# Patient Record
Sex: FEMALE | Race: WHITE | Employment: FULL TIME | URBAN - METROPOLITAN AREA
[De-identification: names, ages, dates, MRNs, and addresses within clinical notes are randomized per-mention and may not be internally consistent; named-entity substitution may affect disease eponyms.]

---

## 2018-10-02 LAB
ABO + RH BLD: NORMAL
ABO + RH BLD: NORMAL
BLD GP AB SCN SERPL QL: NORMAL
HBV SURFACE AG SERPL QL IA: NORMAL
HIV 1+2 AB+HIV1 P24 AG SERPL QL IA: NORMAL
RUBELLA ANTIBODY IGG QUANTITATIVE: NORMAL IU/ML

## 2019-04-09 LAB — GROUP B STREP PCR: POSITIVE

## 2019-05-02 ENCOUNTER — HOSPITAL ENCOUNTER (OUTPATIENT)
Facility: CLINIC | Age: 34
Discharge: HOME | End: 2019-05-02
Attending: OBSTETRICS & GYNECOLOGY | Admitting: OBSTETRICS & GYNECOLOGY
Payer: COMMERCIAL

## 2019-05-02 VITALS
SYSTOLIC BLOOD PRESSURE: 129 MMHG | BODY MASS INDEX: 28.41 KG/M2 | RESPIRATION RATE: 18 BRPM | DIASTOLIC BLOOD PRESSURE: 83 MMHG | WEIGHT: 181 LBS | TEMPERATURE: 98.8 F | HEIGHT: 67 IN

## 2019-05-02 PROBLEM — Z36.89 ENCOUNTER FOR TRIAGE IN PREGNANT PATIENT: Status: ACTIVE | Noted: 2019-05-02

## 2019-05-02 LAB — RUPTURE OF FETAL MEMBRANES BY ROM PLUS: NEGATIVE

## 2019-05-02 PROCEDURE — 59025 FETAL NON-STRESS TEST: CPT

## 2019-05-02 PROCEDURE — G0463 HOSPITAL OUTPT CLINIC VISIT: HCPCS | Mod: 25

## 2019-05-02 PROCEDURE — 84112 EVAL AMNIOTIC FLUID PROTEIN: CPT | Performed by: OBSTETRICS & GYNECOLOGY

## 2019-05-02 RX ORDER — PRENATAL VIT/IRON FUM/FOLIC AC 27MG-0.8MG
1 TABLET ORAL DAILY
COMMUNITY

## 2019-05-02 RX ORDER — OXYTOCIN 10 [USP'U]/ML
10 INJECTION, SOLUTION INTRAMUSCULAR; INTRAVENOUS
Status: CANCELLED | OUTPATIENT
Start: 2019-05-02

## 2019-05-02 RX ORDER — SODIUM CHLORIDE, SODIUM LACTATE, POTASSIUM CHLORIDE, CALCIUM CHLORIDE 600; 310; 30; 20 MG/100ML; MG/100ML; MG/100ML; MG/100ML
INJECTION, SOLUTION INTRAVENOUS CONTINUOUS
Status: CANCELLED | OUTPATIENT
Start: 2019-05-02

## 2019-05-02 RX ORDER — LIDOCAINE 40 MG/G
CREAM TOPICAL
Status: CANCELLED | OUTPATIENT
Start: 2019-05-02

## 2019-05-02 RX ORDER — METHYLERGONOVINE MALEATE 0.2 MG/ML
200 INJECTION INTRAVENOUS
Status: CANCELLED | OUTPATIENT
Start: 2019-05-02

## 2019-05-02 RX ORDER — NALOXONE HYDROCHLORIDE 0.4 MG/ML
.1-.4 INJECTION, SOLUTION INTRAMUSCULAR; INTRAVENOUS; SUBCUTANEOUS
Status: CANCELLED | OUTPATIENT
Start: 2019-05-02

## 2019-05-02 RX ORDER — CEFAZOLIN SODIUM 2 G/100ML
2 INJECTION, SOLUTION INTRAVENOUS ONCE
Status: CANCELLED | OUTPATIENT
Start: 2019-05-02 | End: 2019-05-02

## 2019-05-02 RX ORDER — CARBOPROST TROMETHAMINE 250 UG/ML
250 INJECTION, SOLUTION INTRAMUSCULAR
Status: CANCELLED | OUTPATIENT
Start: 2019-05-02

## 2019-05-02 RX ORDER — OXYCODONE AND ACETAMINOPHEN 5; 325 MG/1; MG/1
1 TABLET ORAL
Status: CANCELLED | OUTPATIENT
Start: 2019-05-02

## 2019-05-02 RX ORDER — CEFAZOLIN SODIUM 1 G/3ML
1 INJECTION, POWDER, FOR SOLUTION INTRAMUSCULAR; INTRAVENOUS EVERY 8 HOURS
Status: CANCELLED | OUTPATIENT
Start: 2019-05-02

## 2019-05-02 RX ORDER — ONDANSETRON 2 MG/ML
4 INJECTION INTRAMUSCULAR; INTRAVENOUS EVERY 6 HOURS PRN
Status: CANCELLED | OUTPATIENT
Start: 2019-05-02

## 2019-05-02 RX ORDER — IBUPROFEN 400 MG/1
800 TABLET, FILM COATED ORAL
Status: CANCELLED | OUTPATIENT
Start: 2019-05-02

## 2019-05-02 RX ORDER — OXYTOCIN/0.9 % SODIUM CHLORIDE 30/500 ML
100-340 PLASTIC BAG, INJECTION (ML) INTRAVENOUS CONTINUOUS PRN
Status: CANCELLED | OUTPATIENT
Start: 2019-05-02

## 2019-05-02 RX ORDER — CETIRIZINE HYDROCHLORIDE 10 MG/1
10 TABLET ORAL DAILY
COMMUNITY

## 2019-05-02 RX ORDER — OXYTOCIN/0.9 % SODIUM CHLORIDE 30/500 ML
1-24 PLASTIC BAG, INJECTION (ML) INTRAVENOUS CONTINUOUS
Status: CANCELLED | OUTPATIENT
Start: 2019-05-02

## 2019-05-02 RX ORDER — ACETAMINOPHEN 325 MG/1
650 TABLET ORAL EVERY 4 HOURS PRN
Status: CANCELLED | OUTPATIENT
Start: 2019-05-02

## 2019-05-02 ASSESSMENT — MIFFLIN-ST. JEOR: SCORE: 1558.64

## 2019-05-02 NOTE — DISCHARGE INSTRUCTIONS
Discharge Instruction for Undelivered Patients      You were seen for: Membrane Assessment  We Consulted:   You had (Test or Medicine): NST, ROM+     Diet:   Drink 8 to 12 glasses of liquids (milk, juice, water) every day.  You may eat meals and snacks.     Activity:  Call your doctor or nurse midwife if your baby is moving less than usual.     Call your provider if you notice:  Swelling in your face or increased swelling in your hands or legs.  Headaches that are not relieved by Tylenol (acetaminophen).  Changes in your vision (blurring: seeing spots or stars.)  Nausea (sick to your stomach) and vomiting (throwing up).   Weight gain of 5 pounds or more per week.  Heartburn that doesn't go away.  Signs of bladder infection: pain when you urinate (use the toilet), need to go more often and more urgently.  The bag of ch (rupture of membranes) breaks, or you notice leaking in your underwear.  Bright red blood in your underwear.  Abdominal (lower belly) or stomach pain.  Second (plus) baby: Contractions (tightening) less than 10 minutes apart and getting stronger.  Increase or change in vaginal discharge (note the color and amount)      Follow-up:  As scheduled in the clinic

## 2019-05-02 NOTE — PLAN OF CARE
1705. Multip, 39 weeks arrives from home after noting some clear vaginal fluid leaking that was noticed at 1500 today.  Monitors applied with pts consent.  Health history obtained.  Pt notes feeling some mild cramping, 1/10.     1730.  ROM+ obtained and sent.   No fluid seen on chux pad that pt is sitting on.   Pt declines feeling the need for cervical exam, as she is not having much pain and is planning to return tomorrow morning for an elective induction with .   1810.  called back, after text sent.  Updated regarding pts arrival, fetal and uterine monitoring, negative amnisure.  Discharge orders received.   Pt verbalizes agreement to go home, denies any needs or concerns, questions answered.

## 2019-05-03 ENCOUNTER — ANESTHESIA EVENT (OUTPATIENT)
Dept: OBGYN | Facility: CLINIC | Age: 34
End: 2019-05-03
Payer: COMMERCIAL

## 2019-05-03 ENCOUNTER — ANESTHESIA (OUTPATIENT)
Dept: OBGYN | Facility: CLINIC | Age: 34
End: 2019-05-03
Payer: COMMERCIAL

## 2019-05-03 ENCOUNTER — HOSPITAL ENCOUNTER (INPATIENT)
Facility: CLINIC | Age: 34
LOS: 2 days | Discharge: HOME HEALTH | End: 2019-05-05
Attending: OBSTETRICS & GYNECOLOGY | Admitting: OBSTETRICS & GYNECOLOGY
Payer: COMMERCIAL

## 2019-05-03 LAB
ABO + RH BLD: ABNORMAL
ABO + RH BLD: ABNORMAL
BLD GP AB INVEST PLASRBC-IMP: ABNORMAL
BLD GP AB SCN SERPL QL: ABNORMAL
BLOOD BANK CMNT PATIENT-IMP: ABNORMAL
BLOOD BANK CMNT PATIENT-IMP: NORMAL
ERYTHROCYTE [DISTWIDTH] IN BLOOD BY AUTOMATED COUNT: 12.8 % (ref 10–15)
HCT VFR BLD AUTO: 38.3 % (ref 35–47)
HGB BLD-MCNC: 13.3 G/DL (ref 11.7–15.7)
MCH RBC QN AUTO: 32.2 PG (ref 26.5–33)
MCHC RBC AUTO-ENTMCNC: 34.7 G/DL (ref 31.5–36.5)
MCV RBC AUTO: 93 FL (ref 78–100)
PLATELET # BLD AUTO: 240 10E9/L (ref 150–450)
RBC # BLD AUTO: 4.13 10E12/L (ref 3.8–5.2)
SPECIMEN EXP DATE BLD: ABNORMAL
WBC # BLD AUTO: 16.2 10E9/L (ref 4–11)

## 2019-05-03 PROCEDURE — 00HU33Z INSERTION OF INFUSION DEVICE INTO SPINAL CANAL, PERCUTANEOUS APPROACH: ICD-10-PCS | Performed by: ANESTHESIOLOGY

## 2019-05-03 PROCEDURE — 25000128 H RX IP 250 OP 636: Performed by: PHYSICIAN ASSISTANT

## 2019-05-03 PROCEDURE — 25800030 ZZH RX IP 258 OP 636: Performed by: PHYSICIAN ASSISTANT

## 2019-05-03 PROCEDURE — 25000128 H RX IP 250 OP 636: Performed by: ANESTHESIOLOGY

## 2019-05-03 PROCEDURE — 86870 RBC ANTIBODY IDENTIFICATION: CPT | Performed by: OBSTETRICS & GYNECOLOGY

## 2019-05-03 PROCEDURE — 86780 TREPONEMA PALLIDUM: CPT | Performed by: OBSTETRICS & GYNECOLOGY

## 2019-05-03 PROCEDURE — 86850 RBC ANTIBODY SCREEN: CPT | Performed by: OBSTETRICS & GYNECOLOGY

## 2019-05-03 PROCEDURE — 27110038 ZZH RX 271: Performed by: ANESTHESIOLOGY

## 2019-05-03 PROCEDURE — 0KQM0ZZ REPAIR PERINEUM MUSCLE, OPEN APPROACH: ICD-10-PCS | Performed by: OBSTETRICS & GYNECOLOGY

## 2019-05-03 PROCEDURE — 3E0R3BZ INTRODUCTION OF ANESTHETIC AGENT INTO SPINAL CANAL, PERCUTANEOUS APPROACH: ICD-10-PCS | Performed by: ANESTHESIOLOGY

## 2019-05-03 PROCEDURE — 86900 BLOOD TYPING SEROLOGIC ABO: CPT | Performed by: OBSTETRICS & GYNECOLOGY

## 2019-05-03 PROCEDURE — 25000132 ZZH RX MED GY IP 250 OP 250 PS 637: Performed by: OBSTETRICS & GYNECOLOGY

## 2019-05-03 PROCEDURE — 86901 BLOOD TYPING SEROLOGIC RH(D): CPT | Performed by: OBSTETRICS & GYNECOLOGY

## 2019-05-03 PROCEDURE — 36415 COLL VENOUS BLD VENIPUNCTURE: CPT | Performed by: OBSTETRICS & GYNECOLOGY

## 2019-05-03 PROCEDURE — 72200001 ZZH LABOR CARE VAGINAL DELIVERY SINGLE

## 2019-05-03 PROCEDURE — 12000035 ZZH R&B POSTPARTUM

## 2019-05-03 PROCEDURE — 37000011 ZZH ANESTHESIA WARD SERVICE

## 2019-05-03 PROCEDURE — 85027 COMPLETE CBC AUTOMATED: CPT | Performed by: OBSTETRICS & GYNECOLOGY

## 2019-05-03 PROCEDURE — 25000125 ZZHC RX 250: Performed by: ANESTHESIOLOGY

## 2019-05-03 PROCEDURE — 25000125 ZZHC RX 250: Performed by: PHYSICIAN ASSISTANT

## 2019-05-03 RX ORDER — NALOXONE HYDROCHLORIDE 0.4 MG/ML
.1-.4 INJECTION, SOLUTION INTRAMUSCULAR; INTRAVENOUS; SUBCUTANEOUS
Status: DISCONTINUED | OUTPATIENT
Start: 2019-05-03 | End: 2019-05-05 | Stop reason: HOSPADM

## 2019-05-03 RX ORDER — CEFAZOLIN SODIUM 1 G/3ML
1 INJECTION, POWDER, FOR SOLUTION INTRAMUSCULAR; INTRAVENOUS EVERY 8 HOURS
Status: DISCONTINUED | OUTPATIENT
Start: 2019-05-03 | End: 2019-05-03

## 2019-05-03 RX ORDER — LANOLIN 100 %
OINTMENT (GRAM) TOPICAL
Status: DISCONTINUED | OUTPATIENT
Start: 2019-05-03 | End: 2019-05-05 | Stop reason: HOSPADM

## 2019-05-03 RX ORDER — OXYCODONE AND ACETAMINOPHEN 5; 325 MG/1; MG/1
1 TABLET ORAL
Status: DISCONTINUED | OUTPATIENT
Start: 2019-05-03 | End: 2019-05-05 | Stop reason: HOSPADM

## 2019-05-03 RX ORDER — ACETAMINOPHEN 325 MG/1
650 TABLET ORAL EVERY 4 HOURS PRN
Status: DISCONTINUED | OUTPATIENT
Start: 2019-05-03 | End: 2019-05-05 | Stop reason: HOSPADM

## 2019-05-03 RX ORDER — OXYTOCIN/0.9 % SODIUM CHLORIDE 30/500 ML
100-340 PLASTIC BAG, INJECTION (ML) INTRAVENOUS CONTINUOUS PRN
Status: COMPLETED | OUTPATIENT
Start: 2019-05-03 | End: 2019-05-03

## 2019-05-03 RX ORDER — METHYLERGONOVINE MALEATE 0.2 MG/ML
200 INJECTION INTRAVENOUS
Status: DISCONTINUED | OUTPATIENT
Start: 2019-05-03 | End: 2019-05-05 | Stop reason: HOSPADM

## 2019-05-03 RX ORDER — BISACODYL 10 MG
10 SUPPOSITORY, RECTAL RECTAL DAILY PRN
Status: DISCONTINUED | OUTPATIENT
Start: 2019-05-05 | End: 2019-05-05 | Stop reason: HOSPADM

## 2019-05-03 RX ORDER — IBUPROFEN 400 MG/1
800 TABLET, FILM COATED ORAL
Status: DISCONTINUED | OUTPATIENT
Start: 2019-05-03 | End: 2019-05-05 | Stop reason: HOSPADM

## 2019-05-03 RX ORDER — NALBUPHINE HYDROCHLORIDE 10 MG/ML
2.5-5 INJECTION, SOLUTION INTRAMUSCULAR; INTRAVENOUS; SUBCUTANEOUS EVERY 6 HOURS PRN
Status: DISCONTINUED | OUTPATIENT
Start: 2019-05-03 | End: 2019-05-03

## 2019-05-03 RX ORDER — AMOXICILLIN 250 MG
2 CAPSULE ORAL 2 TIMES DAILY
Status: DISCONTINUED | OUTPATIENT
Start: 2019-05-03 | End: 2019-05-05 | Stop reason: HOSPADM

## 2019-05-03 RX ORDER — SODIUM CHLORIDE, SODIUM LACTATE, POTASSIUM CHLORIDE, CALCIUM CHLORIDE 600; 310; 30; 20 MG/100ML; MG/100ML; MG/100ML; MG/100ML
INJECTION, SOLUTION INTRAVENOUS CONTINUOUS
Status: DISCONTINUED | OUTPATIENT
Start: 2019-05-03 | End: 2019-05-05 | Stop reason: HOSPADM

## 2019-05-03 RX ORDER — AMOXICILLIN 250 MG
1 CAPSULE ORAL 2 TIMES DAILY
Status: DISCONTINUED | OUTPATIENT
Start: 2019-05-03 | End: 2019-05-05 | Stop reason: HOSPADM

## 2019-05-03 RX ORDER — CEFAZOLIN SODIUM 2 G/100ML
2 INJECTION, SOLUTION INTRAVENOUS ONCE
Status: COMPLETED | OUTPATIENT
End: 2019-05-03

## 2019-05-03 RX ORDER — ONDANSETRON 2 MG/ML
4 INJECTION INTRAMUSCULAR; INTRAVENOUS EVERY 6 HOURS PRN
Status: DISCONTINUED | OUTPATIENT
Start: 2019-05-03 | End: 2019-05-05 | Stop reason: HOSPADM

## 2019-05-03 RX ORDER — FENTANYL CITRATE 50 UG/ML
100 INJECTION, SOLUTION INTRAMUSCULAR; INTRAVENOUS ONCE
Status: COMPLETED | OUTPATIENT
Start: 2019-05-03 | End: 2019-05-03

## 2019-05-03 RX ORDER — EPHEDRINE SULFATE 50 MG/ML
5 INJECTION, SOLUTION INTRAMUSCULAR; INTRAVENOUS; SUBCUTANEOUS
Status: DISCONTINUED | OUTPATIENT
Start: 2019-05-03 | End: 2019-05-03

## 2019-05-03 RX ORDER — OXYTOCIN/0.9 % SODIUM CHLORIDE 30/500 ML
1-24 PLASTIC BAG, INJECTION (ML) INTRAVENOUS CONTINUOUS
Status: DISCONTINUED | OUTPATIENT
Start: 2019-05-03 | End: 2019-05-05 | Stop reason: HOSPADM

## 2019-05-03 RX ORDER — ROPIVACAINE HYDROCHLORIDE 2 MG/ML
10 INJECTION, SOLUTION EPIDURAL; INFILTRATION; PERINEURAL ONCE
Status: COMPLETED | OUTPATIENT
Start: 2019-05-03 | End: 2019-05-03

## 2019-05-03 RX ORDER — HYDROCORTISONE 2.5 %
CREAM (GRAM) TOPICAL 3 TIMES DAILY PRN
Status: DISCONTINUED | OUTPATIENT
Start: 2019-05-03 | End: 2019-05-05 | Stop reason: HOSPADM

## 2019-05-03 RX ORDER — CETIRIZINE HYDROCHLORIDE 10 MG/1
10 TABLET ORAL DAILY
Status: DISCONTINUED | OUTPATIENT
Start: 2019-05-03 | End: 2019-05-05 | Stop reason: HOSPADM

## 2019-05-03 RX ORDER — FLUTICASONE PROPIONATE 50 MCG
1 SPRAY, SUSPENSION (ML) NASAL DAILY
COMMUNITY

## 2019-05-03 RX ORDER — OXYTOCIN/0.9 % SODIUM CHLORIDE 30/500 ML
100 PLASTIC BAG, INJECTION (ML) INTRAVENOUS CONTINUOUS
Status: DISCONTINUED | OUTPATIENT
Start: 2019-05-03 | End: 2019-05-05 | Stop reason: HOSPADM

## 2019-05-03 RX ORDER — LIDOCAINE HYDROCHLORIDE AND EPINEPHRINE 15; 5 MG/ML; UG/ML
INJECTION, SOLUTION EPIDURAL PRN
Status: DISCONTINUED | OUTPATIENT
Start: 2019-05-03 | End: 2019-05-04 | Stop reason: HOSPADM

## 2019-05-03 RX ORDER — IBUPROFEN 400 MG/1
800 TABLET, FILM COATED ORAL EVERY 6 HOURS PRN
Status: DISCONTINUED | OUTPATIENT
Start: 2019-05-03 | End: 2019-05-05 | Stop reason: HOSPADM

## 2019-05-03 RX ORDER — CARBOPROST TROMETHAMINE 250 UG/ML
250 INJECTION, SOLUTION INTRAMUSCULAR
Status: DISCONTINUED | OUTPATIENT
Start: 2019-05-03 | End: 2019-05-05 | Stop reason: HOSPADM

## 2019-05-03 RX ORDER — MISOPROSTOL 200 UG/1
800 TABLET ORAL
Status: DISCONTINUED | OUTPATIENT
Start: 2019-05-03 | End: 2019-05-05 | Stop reason: HOSPADM

## 2019-05-03 RX ORDER — OXYTOCIN/0.9 % SODIUM CHLORIDE 30/500 ML
340 PLASTIC BAG, INJECTION (ML) INTRAVENOUS CONTINUOUS PRN
Status: DISCONTINUED | OUTPATIENT
Start: 2019-05-03 | End: 2019-05-05 | Stop reason: HOSPADM

## 2019-05-03 RX ORDER — OXYTOCIN 10 [USP'U]/ML
10 INJECTION, SOLUTION INTRAMUSCULAR; INTRAVENOUS
Status: DISCONTINUED | OUTPATIENT
Start: 2019-05-03 | End: 2019-05-05 | Stop reason: HOSPADM

## 2019-05-03 RX ORDER — LIDOCAINE 40 MG/G
CREAM TOPICAL
Status: DISCONTINUED | OUTPATIENT
Start: 2019-05-03 | End: 2019-05-05 | Stop reason: HOSPADM

## 2019-05-03 RX ORDER — NALOXONE HYDROCHLORIDE 0.4 MG/ML
.1-.4 INJECTION, SOLUTION INTRAMUSCULAR; INTRAVENOUS; SUBCUTANEOUS
Status: DISCONTINUED | OUTPATIENT
Start: 2019-05-03 | End: 2019-05-03

## 2019-05-03 RX ADMIN — OXYTOCIN-SODIUM CHLORIDE 0.9% IV SOLN 30 UNIT/500ML 340 ML/HR: 30-0.9/5 SOLUTION at 14:27

## 2019-05-03 RX ADMIN — CETIRIZINE HYDROCHLORIDE 10 MG: 10 TABLET, FILM COATED ORAL at 20:29

## 2019-05-03 RX ADMIN — CEFAZOLIN SODIUM 2 G: 2 INJECTION, SOLUTION INTRAVENOUS at 08:19

## 2019-05-03 RX ADMIN — Medication 12 ML/HR: at 13:30

## 2019-05-03 RX ADMIN — ROPIVACAINE HYDROCHLORIDE 10 ML: 2 INJECTION, SOLUTION EPIDURAL; INFILTRATION at 13:04

## 2019-05-03 RX ADMIN — ACETAMINOPHEN 650 MG: 325 TABLET, FILM COATED ORAL at 20:29

## 2019-05-03 RX ADMIN — SODIUM CHLORIDE, POTASSIUM CHLORIDE, SODIUM LACTATE AND CALCIUM CHLORIDE: 600; 310; 30; 20 INJECTION, SOLUTION INTRAVENOUS at 08:15

## 2019-05-03 RX ADMIN — FENTANYL CITRATE 100 MCG: 50 INJECTION, SOLUTION INTRAMUSCULAR; INTRAVENOUS at 13:04

## 2019-05-03 RX ADMIN — LIDOCAINE HYDROCHLORIDE AND EPINEPHRINE 3 ML: 15; 5 INJECTION, SOLUTION EPIDURAL at 13:01

## 2019-05-03 RX ADMIN — SENNOSIDES AND DOCUSATE SODIUM 1 TABLET: 8.6; 5 TABLET ORAL at 20:30

## 2019-05-03 RX ADMIN — IBUPROFEN 800 MG: 400 TABLET ORAL at 15:31

## 2019-05-03 RX ADMIN — IBUPROFEN 800 MG: 400 TABLET ORAL at 20:30

## 2019-05-03 RX ADMIN — ACETAMINOPHEN 650 MG: 325 TABLET, FILM COATED ORAL at 15:31

## 2019-05-03 ASSESSMENT — MIFFLIN-ST. JEOR: SCORE: 1558.64

## 2019-05-03 NOTE — PLAN OF CARE
Nga presents with  Meghan for elective induction of labor. She was here last evening to be evaluated for leaking fluid, but was glad to go home for some rest. Nga agrees to placement of fetal and uterine monitors and placement of IV for GBS antibiotic prophylaxis. 2g ancef given d/t amoxicillin allergy. FHT moderate variability, 140s with accelerations present. Occasional contractions. Dr. Singh in department to start induction and for AROM. SVE 4cm/  70  / -2   Posterior. Plan per Dr. Singh is to evaluate SVE again in 2-3 hours or sooner if labor progresses. If no cervical change, begin oxytocin induction.

## 2019-05-03 NOTE — L&D DELIVERY NOTE
[facility]        The patient is a 34 year old  at 39+1 wks gestation admitted to labor and delivery in [facility].      For pain management she had an epidural.  She had excellent maternal expulsive efforts. She delivered a viable male infant apgars of 8 at 1 min and 9 at 5 minutes.  The infant was bulb suctioned upon delivery.   The nursery team was in attendance.    The placenta delivered spontaneously and intact.  She received pitocin upon placental delivery.  The estimated blood loss was 200cc.    The cervix and vagina were inspected.  Second degree laceration repaired with 3-0 Vicryl.      Mother and baby did well and went to normal postpartum and  nursery.        Lorin Montano MD

## 2019-05-03 NOTE — PLAN OF CARE
Dr. Montano in room for SVE - states unchanged. Would like to begin pitocin.    Nga breathing well through ctx but visibly grimacing and uncomfortable. Requesting epidural. Ctx q2-3 minutes and palpate moderate.

## 2019-05-03 NOTE — PLAN OF CARE
Ctx mild and increasing frequency q4-7mins; Leaking scant fluid and scant blood tinge show when wiping in bathroom. Currently up ambulating in dhillon.    Nga looking teary-eyed and quiet. Minimal eye contact. States that she's  anxious because this is different than first birth.

## 2019-05-03 NOTE — PROVIDER NOTIFICATION
Dr. Montano notified of prolonged deceleration post-epidural. BP decreased  To 96/52. Resolved with position changes left tilt to right lateral and 250 ml IV fluid bolus.    SVE 8/95/-1.Requested to recheck SVE in 30 minutes. Dr. Montano in house and awaiting notification for delivery.

## 2019-05-03 NOTE — PLAN OF CARE
SVE 10/100/+1. Nga feeling intermittent pressure. Dr. Mcgregor notified to come for delivery. Room setup.

## 2019-05-03 NOTE — ANESTHESIA PROCEDURE NOTES
Peripheral nerve/Neuraxial procedure note : epidural catheter  Pre-Procedure  Performed by Chaz Silva MD  Location: OB      Pre-Anesthestic Checklist: patient identified, IV checked, site marked, risks and benefits discussed, informed consent, monitors and equipment checked, pre-op evaluation and at physician/surgeon's request    Timeout  Correct Patient: Yes   Correct Procedure: Yes   Correct Site: Yes   Correct Laterality: N/A   Correct Position: Yes   Site Marked: N/A   .   Procedure Documentation    .    Procedure:    Epidural catheter.  Insertion Site:L3-4  (midline approach) Injection technique: LORT saline   Local skin infiltrated with 3 mL of 1% lidocaine.  LIN at 6 cm     Patient Prep;mask, sterile gloves, povidone-iodine 7.5% surgical scrub, patient draped.  .  Needle: Touhy needle Needle Gauge: 17.    Needle Length (Inches) 3.5  # of attempts: 1 and # of redirects:  .   Catheter: 19 G . .  Catheter threaded easily  .  9 cm at skin.   .    Assessment/Narrative  Paresthesias: No.  .  .  Aspiration negative for heme or CSF  . Test dose of 3 mL lidocaine 1.5% w/ 1:200,000 epinephrine at. Test dose negative for signs of intravascular, subdural or intrathecal injection. Comments:  No complications   Secured with Tegaderm, adhesive spray and tape

## 2019-05-03 NOTE — ANESTHESIA PREPROCEDURE EVALUATION
"Anesthesia Pre-Procedure Evaluation    Patient: Nga Mckeon   MRN: [id number] : 1985          Preoperative Diagnosis: * No surgery found *        Past Medical History:   Diagnosis Date     Uncomplicated asthma     uses albuterol inhaler     Past Surgical History:   Procedure Laterality Date     HERNIA REPAIR  2007     HERNIA REPAIR  2007     HIP SURGERY  2017    left      [facility]         Anesthesia Evaluation       history and physical reviewed .             ROS/MED HX    ENT/Pulmonary:       Neurologic:       Cardiovascular:         METS/Exercise Tolerance:     Hematologic:         Musculoskeletal:         GI/Hepatic:         Renal/Genitourinary:         Endo:         Psychiatric:         Infectious Disease:         Malignancy:         Other:                                 Lab Results   Component Value Date    WBC 16.2 (H) 2019    HGB 13.3 2019    HCT 38.3 2019     2019       Preop Vitals  BP Readings from Last 3 Encounters:   19 121/82   19 129/83    Pulse Readings from Last 3 Encounters:   No data found for Pulse      Resp Readings from Last 3 Encounters:   19 16   19 18    SpO2 Readings from Last 3 Encounters:   No data found for SpO2      Temp Readings from Last 1 Encounters:   19 37.2  C (98.9  F) (Temporal)    Ht Readings from Last 1 Encounters:   19 1.702 m (5' 7\")      Wt Readings from Last 1 Encounters:   19 82.1 kg (181 lb)    Estimated body mass index is 28.35 kg/m  as calculated from the following:    Height as of this encounter: 1.702 m (5' 7\").    Weight as of this encounter: 82.1 kg (181 lb).       Anesthesia Plan      History & Physical Review      ASA Status:  2 .        Plan for Epidural          Postoperative Care      Consents  Anesthetic plan, risks, benefits and alternatives discussed with:  Patient and Spouse..                 Chaz Silva MD  "

## 2019-05-03 NOTE — PLAN OF CARE
Dr. Montano at bedside at 1418, Nga positioned in stirrups, bladder emptied for approximately 300cc before pushing started. Pushing efforts through two contractions and delivery of viable male apgars 8,9 at 1424. Placed skin to skin with one minute delayed cord clamping.

## 2019-05-03 NOTE — PLAN OF CARE
1040: Dr Montano in department. Would like to start oxytocin augmentation per orders.    1050: SVE 5.5/80/-2, ctx q3-4 mins and increasing in intensity. Nga states that they start her in back and wrap around front. D/t cervical change pt. Would prefer to hold off on oxytocin at this time    1105: Dr. Montano notified via electronic page that pt. Would prefer to hold off on oxytocin and SVE.

## 2019-05-04 LAB
HGB BLD-MCNC: 12.9 G/DL (ref 11.7–15.7)
T PALLIDUM AB SER QL: NONREACTIVE

## 2019-05-04 PROCEDURE — 12000035 ZZH R&B POSTPARTUM

## 2019-05-04 PROCEDURE — 86900 BLOOD TYPING SEROLOGIC ABO: CPT | Performed by: OBSTETRICS & GYNECOLOGY

## 2019-05-04 PROCEDURE — 86901 BLOOD TYPING SEROLOGIC RH(D): CPT | Performed by: OBSTETRICS & GYNECOLOGY

## 2019-05-04 PROCEDURE — 25000132 ZZH RX MED GY IP 250 OP 250 PS 637: Performed by: OBSTETRICS & GYNECOLOGY

## 2019-05-04 PROCEDURE — 85461 HEMOGLOBIN FETAL: CPT | Performed by: OBSTETRICS & GYNECOLOGY

## 2019-05-04 PROCEDURE — 25000128 H RX IP 250 OP 636: Performed by: OBSTETRICS & GYNECOLOGY

## 2019-05-04 PROCEDURE — 85018 HEMOGLOBIN: CPT | Performed by: OBSTETRICS & GYNECOLOGY

## 2019-05-04 PROCEDURE — 36415 COLL VENOUS BLD VENIPUNCTURE: CPT | Performed by: OBSTETRICS & GYNECOLOGY

## 2019-05-04 RX ADMIN — CETIRIZINE HYDROCHLORIDE 10 MG: 10 TABLET, FILM COATED ORAL at 22:02

## 2019-05-04 RX ADMIN — ACETAMINOPHEN 650 MG: 325 TABLET, FILM COATED ORAL at 08:53

## 2019-05-04 RX ADMIN — SENNOSIDES AND DOCUSATE SODIUM 1 TABLET: 8.6; 5 TABLET ORAL at 08:53

## 2019-05-04 RX ADMIN — ACETAMINOPHEN 650 MG: 325 TABLET, FILM COATED ORAL at 22:00

## 2019-05-04 RX ADMIN — IBUPROFEN 800 MG: 400 TABLET ORAL at 02:43

## 2019-05-04 RX ADMIN — IBUPROFEN 800 MG: 400 TABLET ORAL at 16:35

## 2019-05-04 RX ADMIN — HUMAN RHO(D) IMMUNE GLOBULIN 300 MCG: 300 INJECTION, SOLUTION INTRAMUSCULAR at 23:51

## 2019-05-04 RX ADMIN — IBUPROFEN 800 MG: 400 TABLET ORAL at 08:53

## 2019-05-04 RX ADMIN — IBUPROFEN 800 MG: 400 TABLET ORAL at 22:00

## 2019-05-04 RX ADMIN — ACETAMINOPHEN 650 MG: 325 TABLET, FILM COATED ORAL at 02:43

## 2019-05-04 RX ADMIN — ACETAMINOPHEN 650 MG: 325 TABLET, FILM COATED ORAL at 16:35

## 2019-05-04 NOTE — PLAN OF CARE
Patient admitted to room 421 around 1645.  Both patient and  oriented to room/floor.  Vital signs stable.  Fundus firm at U. Scant rubra flow.  Voiding without difficulty.  S.L. Discontinued at 2100 and patient wanting to shower.  Pain controlled with tylenol and ibuprofen.  Patient requesting Zertec for allergies.  Orders received per MD dodson and given at 2030.  Continue to monitor.

## 2019-05-04 NOTE — PLAN OF CARE
Vital signs stable, afebrile, voiding well, ice and tucks to perineum prn, FFU/1-2 scant rubra lochia, able to rest, pain well controlled with medications, rates her pain 3/10, breast feeding  skin-to-skin on demand.  is here and is supportive.

## 2019-05-04 NOTE — PROGRESS NOTES
S: Patient doing well with no overnight issues and no current complaints.  Pain is well controlled.  Tolerating PO intake.  Breast feeding without issues.  Ambulating without difficulty.  Voiding and passing flatus.  Lochia is less than normal menses and decreasing.  Denies fevers, headaches, changes in vision, chest pain, SOB, nausea, vomiting, diarrhea, constipation, RUQ tenderness, dysuria, or LE pain.    O:   Vitals:    19 1746 19 2000 19 0200 19 0804   BP: 115/68 126/80 120/81 127/86   Resp:  18   Temp: 98.4  F (36.9  C) 98  F (36.7  C) 98.1  F (36.7  C) 98.1  F (36.7  C)   TempSrc: Oral Oral Oral    SpO2:       Weight:       Height:         NAD, AAOx3, RRR, CTAB, ABd soft NTND, Firm fundus 1cm below the umbilicus, LE NT no edema    A: 35yo  s/PPD#1  who is recovering well.    P: Routine post partum care.  Plan for discharge tomorrow.  Desire circ for baby boy.

## 2019-05-04 NOTE — PLAN OF CARE
Doing well,vss,voiding with out difficulty,pain control with tylenol&ibuprofen,baby breast feeding well.

## 2019-05-04 NOTE — LACTATION NOTE
Initial visit with Nga, FOB , family and baby boy.  Baby content and alert looking at Nga.   Breastfeeding general information reviewed. Advised to breastfeed exclusively, on demand, avoid pacifiers, bottles and formula unless medically indicated.  Encouraged rooming in, skin to skin, feeding on demand 8-12x/day or sooner if baby cues.  Explained benefits of holding and skin to skin.  Encouraged lots of skin to skin. Instructed on hand expression.   Continues to nurse well per mom, occasionally pinching.  Instructed on how to obtain a deep latch positioning aides.  Will follow up with Nader and has a breast pump for home.  No further questions at this time. Will follow as needed.    Graciela Capps BSN, RN, PHN, RNC-MNN, IBCLC

## 2019-05-05 VITALS
BODY MASS INDEX: 28.41 KG/M2 | HEART RATE: 89 BPM | OXYGEN SATURATION: 100 % | SYSTOLIC BLOOD PRESSURE: 125 MMHG | DIASTOLIC BLOOD PRESSURE: 78 MMHG | RESPIRATION RATE: 16 BRPM | HEIGHT: 67 IN | TEMPERATURE: 98 F | WEIGHT: 181 LBS

## 2019-05-05 LAB
ABO + RH BLD: NORMAL
ABO + RH BLD: NORMAL
BLOOD BANK CMNT PATIENT-IMP: NORMAL
DATE RH IMM GL GVN: NORMAL
FETAL CELL SCN BLD QL ROSETTE: NORMAL
RH IG VIALS RECOM PATIENT: NORMAL

## 2019-05-05 PROCEDURE — 25000132 ZZH RX MED GY IP 250 OP 250 PS 637: Performed by: OBSTETRICS & GYNECOLOGY

## 2019-05-05 RX ORDER — IBUPROFEN 800 MG/1
800 TABLET, FILM COATED ORAL EVERY 6 HOURS PRN
COMMUNITY
Start: 2019-05-05

## 2019-05-05 RX ORDER — AMOXICILLIN 250 MG
1 CAPSULE ORAL 2 TIMES DAILY
COMMUNITY
Start: 2019-05-05 | End: 2019-09-19

## 2019-05-05 RX ORDER — ACETAMINOPHEN 325 MG/1
650 TABLET ORAL EVERY 4 HOURS PRN
COMMUNITY
Start: 2019-05-05 | End: 2019-09-19

## 2019-05-05 RX ADMIN — IBUPROFEN 800 MG: 400 TABLET ORAL at 11:49

## 2019-05-05 RX ADMIN — ACETAMINOPHEN 650 MG: 325 TABLET, FILM COATED ORAL at 11:49

## 2019-05-05 RX ADMIN — IBUPROFEN 800 MG: 400 TABLET ORAL at 05:16

## 2019-05-05 RX ADMIN — ACETAMINOPHEN 650 MG: 325 TABLET, FILM COATED ORAL at 05:16

## 2019-05-05 NOTE — PLAN OF CARE
Fundus firm and bleeding wnl.  VSS.  Voiding without difficulty.  Taking tylenol and ibuprofen every 6 hrs with good relief.  Up independently.  Using ice and tucks.  Encouraged to call with questions or concerns.  Will continue to monitor.

## 2019-05-05 NOTE — ANESTHESIA POSTPROCEDURE EVALUATION
Patient: Nga Mckeon    * No procedures listed *    Diagnosis:* No pre-op diagnosis entered *  Diagnosis Additional Information: No value filed.    Anesthesia Type:  No value filed.    Note:  Anesthesia Post Evaluation    Patient location during evaluation: Floor and Bedside  Patient participation: Able to fully participate in evaluation  Level of consciousness: awake and alert  Cardiovascular status: acceptable  Respiratory status: acceptable       Comments: No apparent anesthetic complications. Patient sensory and motor intact.  Ambulating and voiding well. Denies HA. Patient satisfied with epidural analgesia.     Janak Fermin D.O.  Staff Anesthesiologist  [facility]          Last vitals:  Vitals:    05/04/19 0200 05/04/19 0804 05/04/19 1635   BP: 120/81 127/86 124/78   Resp: 18 18 16   Temp: 36.7  C (98.1  F) 36.7  C (98.1  F) 36.3  C (97.3  F)   SpO2:            Electronically Signed By: Janak Fermin DO  May 4, 2019  7:54 PM

## 2019-05-05 NOTE — PROGRESS NOTES
[facility]       DAILY NOTE - POSTPARTUM DAY 2     SUBJECTIVE:     Pain controlled? Yes  Tolerating a regular diet? YES  Ambulating? YES  Voiding without difficulty? Yes    OBJECTIVE:  Vitals:    19 2300 19 0516 19 0616 19 0747   BP:    125/78   BP Location:    Right arm   Pulse:       Resp: 16 16 16 16   Temp:    98  F (36.7  C)   TempSrc:    Oral   SpO2:       Weight:       Height:           Constitutional: healthy, alert and no distress    Abdomen:  Uterine fundus is firm, non-tender and at the level of the umbilicus     extr neg      LABS:  Hemoglobin   Date Value Ref Range Status   2019 12.9 11.7 - 15.7 g/dL Final   2019 13.3 11.7 - 15.7 g/dL Final       ASSESSMENT:  Post-partum day #2 s/p   Pregnancy complicated by NO COMPLICATIONS    Doing well.       PLAN:   Discharge today.  Return to clinic in 6 weeks.   Continue routine postpartum cares    Shannan Rodriguez

## 2019-05-05 NOTE — PLAN OF CARE
Doing well,vss,voiding with out difficulty,pain control with tylenol&ibuprofen,baby breast feeding well,discharge today.

## 2019-05-05 NOTE — LACTATION NOTE
Routine visit with Nga, FOB, and baby.  Cluster fed last night.  Getting ready for discharge.  Plan: Watch for feeding cues and feed every 2-3 hours and/or on demand. Continue to use feeding log to track intake and appropriate voids and stools. Take feeding log to first follow up appointment or weight check. Encourage skin to skin to promote frequent feedings, thermoregulation and bonding. Follow-up with healthcare provider or lactation consultant for questions or concerns.    Continues to nurse well per mom. No further questions at this time.   Will follow as needed.   Graciela Capps BSN, RN, PHN, RNC-MNN, IBCLC

## 2019-06-20 ENCOUNTER — TRANSFERRED RECORDS (OUTPATIENT)
Dept: HEALTH INFORMATION MANAGEMENT | Facility: CLINIC | Age: 34
End: 2019-06-20

## 2019-09-17 NOTE — PROGRESS NOTES
INDICATIONS:                                                      Is a pregnancy test required: Yes.  Was it positive or negative?  Negative  Was a consent obtained?  Yes    Nga Mckeon is a 34 year old female,   who presents for insertion of an IUD. The risks, benefits and alternatives of IUD insertion were discussed in detail previously. She also has reviewed the product brochure.  She has elected to go ahead with the insertion  today and her questions were answered. Consent was signed. Her LMP began on 2018 and was normal in duration and amount of flow. A pregnancy test was performed today:  Yes    Today's PHQ-2 Score: No flowsheet data found.    PROCEDURE:                                                      The pelvic exam revealed normal external genitalia. On bimanual exam the uterus was Anteverted and Retroverted and normal in size with no tenderness present. A speculum was inserted into the vagina and the cervix was visualized. The cervix was prepped with Betadine . The anterior lip of the cervix was grasped with a single toothed tenaculum. The uterus sounded to 8 cm. A Mirena IUD was then inserted without difficulty. The string was cut to 2 cm.  The patient experienced a mild amount of cramping.    POST PROCEDURE:                                                      She  tolerated the procedure well. There were no complications. Patient was discharged in stable condition.    Return to clinic in 5 weeks for IUD check.  Call if severe cramping, fever, abnormal bleeding, abnormal discharge or pelvic pain develop.  Patient was counseled about the chance of irregular bleeding.    Charmaine De La Vega MD

## 2019-09-19 ENCOUNTER — OFFICE VISIT (OUTPATIENT)
Dept: OBGYN | Facility: CLINIC | Age: 34
End: 2019-09-19
Payer: COMMERCIAL

## 2019-09-19 VITALS
DIASTOLIC BLOOD PRESSURE: 72 MMHG | HEIGHT: 67 IN | BODY MASS INDEX: 25.71 KG/M2 | SYSTOLIC BLOOD PRESSURE: 112 MMHG | WEIGHT: 163.8 LBS

## 2019-09-19 DIAGNOSIS — Z30.430 ENCOUNTER FOR IUD INSERTION: ICD-10-CM

## 2019-09-19 DIAGNOSIS — Z01.812 PRE-PROCEDURE LAB EXAM: ICD-10-CM

## 2019-09-19 DIAGNOSIS — M25.551 PAIN OF RIGHT HIP JOINT: Primary | ICD-10-CM

## 2019-09-19 DIAGNOSIS — Z30.430 ENCOUNTER FOR INSERTION OF INTRAUTERINE CONTRACEPTIVE DEVICE: ICD-10-CM

## 2019-09-19 LAB — HCG UR QL: NEGATIVE

## 2019-09-19 PROCEDURE — 81025 URINE PREGNANCY TEST: CPT | Performed by: OBSTETRICS & GYNECOLOGY

## 2019-09-19 PROCEDURE — 58300 INSERT INTRAUTERINE DEVICE: CPT | Performed by: OBSTETRICS & GYNECOLOGY

## 2019-09-19 ASSESSMENT — ANXIETY QUESTIONNAIRES
GAD7 TOTAL SCORE: 0
6. BECOMING EASILY ANNOYED OR IRRITABLE: NOT AT ALL
2. NOT BEING ABLE TO STOP OR CONTROL WORRYING: NOT AT ALL
IF YOU CHECKED OFF ANY PROBLEMS ON THIS QUESTIONNAIRE, HOW DIFFICULT HAVE THESE PROBLEMS MADE IT FOR YOU TO DO YOUR WORK, TAKE CARE OF THINGS AT HOME, OR GET ALONG WITH OTHER PEOPLE: NOT DIFFICULT AT ALL
7. FEELING AFRAID AS IF SOMETHING AWFUL MIGHT HAPPEN: NOT AT ALL
1. FEELING NERVOUS, ANXIOUS, OR ON EDGE: NOT AT ALL
5. BEING SO RESTLESS THAT IT IS HARD TO SIT STILL: NOT AT ALL
3. WORRYING TOO MUCH ABOUT DIFFERENT THINGS: NOT AT ALL

## 2019-09-19 ASSESSMENT — PATIENT HEALTH QUESTIONNAIRE - PHQ9
SUM OF ALL RESPONSES TO PHQ QUESTIONS 1-9: 1
5. POOR APPETITE OR OVEREATING: NOT AT ALL

## 2019-09-19 ASSESSMENT — MIFFLIN-ST. JEOR: SCORE: 1484.58

## 2019-09-20 ENCOUNTER — TELEPHONE (OUTPATIENT)
Dept: OBGYN | Facility: CLINIC | Age: 34
End: 2019-09-20

## 2019-09-20 ASSESSMENT — ANXIETY QUESTIONNAIRES: GAD7 TOTAL SCORE: 0

## 2019-09-20 NOTE — TELEPHONE ENCOUNTER
Pt saw  yesterday she was going to give her a refferal for physical therapy and pt forgot to mention which clinic she usually goes to for that.   Herman ken [place]

## 2019-10-15 NOTE — PROGRESS NOTES
SUBJECTIVE:                                                   Nga Mckeon is a 34 year old female who presents to clinic today for the following health issue(s):  Patient presents with:  IUD: IUD check No concerns          HPI:  Patient presents for IUD follow up.  Patient reports that she has no cramping pain.  Patient reports that she has had no abnormal bleeding.  Patient reports that she has no concerns with IUD.    No LMP recorded. (Menstrual status: IUD)..     Patient is sexually active, .  Using IUD for contraception.    reports that she has never smoked. She has never used smokeless tobacco.    STD testing offered?  Declined    Health maintenance updated:  yes    Today's PHQ-2 Score:   PHQ-2 (  [employer]) 2019   Q1: Little interest or pleasure in doing things 0   Q2: Feeling down, depressed or hopeless 0   PHQ-2 Score 0     Today's PHQ-9 Score:   PHQ-9 SCORE 2019   PHQ-9 Total Score 1     Today's ANDREW-7 Score:   ANDREW-7 SCORE 2019   Total Score 0       Problem list and histories reviewed & adjusted, as indicated.  Additional history: as documented.    Patient Active Problem List   Diagnosis     Encounter for triage in pregnant patient     Indication for care in labor or delivery     Vaginal delivery     Past Surgical History:   Procedure Laterality Date     HERNIA REPAIR       HERNIA REPAIR  2007     HIP SURGERY  2017    left      [facility]Y       [facility]        Social History     Tobacco Use     Smoking status: Never Smoker     Smokeless tobacco: Never Used   Substance Use Topics     Alcohol use: Yes      Problem (# of Occurrences) Relation (Name,Age of Onset)    Breast Cancer (1) Paternal Grandmother (40)    Heart Disease (1) Maternal Grandmother (60)    Hypertension (1) Mother (40)    Kidney Cancer (1) Maternal Grandfather (65)    Pancreatic Cancer (1) Mother (63)    Thyroid Cancer (1) Maternal Aunt (35)    Thyroid Disease (1) Mother (35)    Uterine  "Cancer (1) Maternal Grandmother (60)            Current Outpatient Medications   Medication Sig     ALBUTEROL IN      cetirizine (ZYRTEC) 10 MG tablet Take 10 mg by mouth daily     fluticasone (FLONASE) 50 MCG/ACT nasal spray Spray 1 spray into both nostrils daily     ibuprofen (ADVIL/MOTRIN) 800 MG tablet Take 1 tablet (800 mg) by mouth every 6 hours as needed for other (cramping)     Prenatal Vit-Fe Fumarate-FA (PRENATAL MULTIVITAMIN W/IRON) 27-0.8 MG tablet Take 1 tablet by mouth daily     No current facility-administered medications for this visit.      Allergies   Allergen Reactions     Amoxil [Amoxicillin] Rash       ROS:  12 point review of systems negative other than symptoms noted below.    OBJECTIVE:     /72   Pulse 78   Ht 1.715 m (5' 7.5\")   Wt 74.3 kg (163 lb 12.8 oz)   BMI 25.28 kg/m    Body mass index is 25.28 kg/m .    Exam:  Constitutional:  Appearance: Well nourished, well developed alert, in no acute distress  Chest:  Respiratory Effort:  Breathing unlabored.   Cardiovascular: no edema  Neurologic:  Mental Status:  Oriented X3.    Psychiatric:  Mentation appears normal and affect normal/bright.  Pelvic Exam:  External Genitalia:     Normal appearance for age, no discharge present, no tenderness present, no inflammatory lesions present, color normal  Vagina:     Normal vaginal vault without central or paravaginal defects, no discharge present, no inflammatory lesions present, no masses present  Bladder:     Nontender to palpation  Urethra:   Urethral Body:  Urethra palpation normal, urethra structural support normal   Urethral Meatus:  No erythema or lesions present  Cervix:     Appearance healthy, no lesions present, nontender to palpation, no bleeding present.  Strings at os      Perineum:     Perineum within normal limits, no evidence of trauma, no rashes or skin lesions present  Anus:     Anus within normal limits, no hemorrhoids present  Inguinal Lymph Nodes:     No lymphadenopathy " present  Pubic Hair:     Normal pubic hair distribution for age  Genitalia and Groin:     No rashes present, no lesions present, no areas of discoloration, no masses present       In-Clinic Test Results:  No results found for this or any previous visit (from the past 24 hour(s)).    ASSESSMENT/PLAN:                                                        ICD-10-CM    1. Intrauterine device surveillance Z30.431    2. Family history of breast cancer Z80.3        There are no Patient Instructions on file for this visit.    1. IUD in place  2.  Patient has significant family history of cancer.  Did discuss doing genetic testing.  Patient is going to get her life insurance panel and she will call for referral to genetic counselor.  3.  Once patient is done breast-feeding she will also proceed with mammogram.  She will call when she is done breast-feeding and we can send referral over.    Lorin Montano MD  [facility] [place]

## 2019-10-22 ENCOUNTER — OFFICE VISIT (OUTPATIENT)
Dept: OBGYN | Facility: CLINIC | Age: 34
End: 2019-10-22
Payer: COMMERCIAL

## 2019-10-22 VITALS
HEIGHT: 68 IN | WEIGHT: 163.8 LBS | DIASTOLIC BLOOD PRESSURE: 72 MMHG | HEART RATE: 78 BPM | SYSTOLIC BLOOD PRESSURE: 110 MMHG | BODY MASS INDEX: 24.83 KG/M2

## 2019-10-22 DIAGNOSIS — Z80.3 FAMILY HISTORY OF BREAST CANCER: ICD-10-CM

## 2019-10-22 DIAGNOSIS — Z30.431 INTRAUTERINE DEVICE SURVEILLANCE: Primary | ICD-10-CM

## 2019-10-22 PROCEDURE — 99213 OFFICE O/P EST LOW 20 MIN: CPT | Performed by: OBSTETRICS & GYNECOLOGY

## 2019-10-22 ASSESSMENT — MIFFLIN-ST. JEOR: SCORE: 1488.55

## 2021-05-12 ENCOUNTER — TRANSCRIBE ORDERS (OUTPATIENT)
Dept: OTHER | Age: 36
End: 2021-05-12

## 2021-05-12 DIAGNOSIS — Z80.9 FAMILY HISTORY OF CANCER: Primary | ICD-10-CM

## 2021-07-07 ENCOUNTER — TRANSCRIBE ORDERS (OUTPATIENT)
Dept: OTHER | Age: 36
End: 2021-07-07

## 2021-07-07 DIAGNOSIS — Z80.9 FAMILY HISTORY OF CANCER: Primary | ICD-10-CM

## 2021-08-05 NOTE — PROGRESS NOTES
Nga is a 36 year old who is being evaluated via a billable video visit.      Video-Visit Details    Type of service: Video Visit    Video Start Time: 12:30  Video End Time: 1:23    Originating Location (pt. Location): Home    Distant Location (provider location): Cancer Risk Management Program    Platform used for Video Visit: [employer]    8/6/2021    Referring Provider: Kinjal Cohen MD    Presenting Information:   I met with Nga Mckeon today for her video genetic counseling visit through the Cancer Risk Management Program to discuss her family history of pancreatic, breast, thyroid, kidney and uterine cancer. She is here today to review this history, cancer screening recommendations, and available genetic testing options.    Personal History:  Nga is a 36 year old female. She  does not have any personal history of cancer. In her hormonal-based medical history, she had her first menstrual period at age 13, her first child at age 31, and is premenopausal. Nga has her ovaries, fallopian tubes and uterus in place, and reports no ovarian cancer screening to date. She reports that she has no history of hormone replacement therapy.  She reports oral contraceptive use for approximately 3-5 years.       Nga had a mammogram when she was 31 years old due to family history, which was normal. Nga has not had nor colonoscopy. Nga denies any history of dermatological exams. She does not regularly do any other cancer screening at this time. Nga reported no history of smoking and occasional alcohol use.    Family History: (Please see scanned pedigree for detailed family history information)    Nga's mother was diagnosed with pancreatic cancer at age 63. Nga reports that her mother had negative genetic testing, however, her report was not available for review today. She passed away at age 64.     Maternal aunt was diagnosed with pancreatic cancer at age 79 and passed away shortly after due to complications of the disease.      Another maternal aunt was diagnosed with thyroid cancer at age 21. She is currently 67.     Maternal grandmother was diagnosed with uterine cancer at in her early 60's. She passed away at age 67.    Maternal grandfather was diagnosed with kidney cancer at age 67 and passed away shortly after due to complications of the disease.     Maternal great uncle (brother of maternal grandfather discussed above) was diagnosed with kidney cancer in his 60's and passed away in his 70's.     Paternal grandmother was diagnosed with breast cancer at age 44. She was later diagnosed with bone cancer in her late 70's. She passed away at age 78.     Of note, Nga's father and three paternal aunts have never been diagnosed with cancer.     Her maternal ethnicity is Urdu, [place]ese, and [place]. Her paternal ethnicity is Persian [place] and [place]. There is no known Ashkenazi Latter-day ancestry on either side of her family. There is no reported consanguinity.    Discussion:    Nga's family history of pancreatic, breast, thyroid, kidney and uterine cancer is suggestive of a hereditary cancer syndrome.  We reviewed the features of sporadic, familial, and hereditary cancers. We discussed the natural history and genetics of several hereditary cancer syndromes, including Hereditary Breast and Ovarian Cancer Syndrome (HBOC) and Grace syndrome.   The most common cause of hereditary breast cancer is HBOC, which is caused by mutations in the BRCA1 and BRCA2 genes. Individuals with HBOC syndrome are at increased risk for several different cancers, including breast, ovarian, male breast, prostate, melanoma, and pancreatic cancer. HBOC typically presents with multiple family members diagnosed with breast cancer before age 50 and/or ovarian cancer.   Grace syndrome can be caused by a mutation in one of five genes:  MLH1, MSH2, MSH6, PMS2, and EPCAM. A single mutation in one of the Grace Syndrome genes increases the risk for several cancers, such as  colon cancer, endometrial/uterine cancer, gastric cancer, and ovarian cancer. Other cancers have also been reported in some families with Grace Syndrome include pancreatic, bladder, biliary tract, urothelial, small bowel, prostate, breast and brain cancers.  A detailed handout regarding information about HBOC and Grace syndrome and related hereditary cancer syndromes will be provided to Nga via [employer] and can be found in the after visit summary. Topics included: inheritance pattern, cancer risks, cancer screening recommendations, and also risks, benefits and limitations of testing.  In looking at Nga's personal and family history, it is possible that a cancer susceptibility gene is present due Nga's mother's diagnosis of pancreatic cancer, maternal aunt diagnosed with pancreatic cancer, maternal grandmother's diagnosis of uterine cancer, and paternal grandmother diagnosed with breast cancer at age 44.  Based on her family history, Nga meets current National Comprehensive Cancer Network (NCCN) criteria for genetic testing of high-penetrance breast and/or ovarian cancer susceptibility genes (including BRCA1, BRCA2, CDH1, PALB2, PTEN, and TP53).    Based on her family history, Nga meets current National Comprehensive Cancer Network (NCCN) criteria for genetic testing of Grace syndrome genes (i.e EPCAM, MLH1, MSH2, MSH6, PMS2).   Based on her family history, Nga meets current National Comprehensive Cancer Network (NCCN) criteria for genetic testing of pancreatic cancer susceptibility genes (KEITH, BRCA1, BRCA2, CDKN2A, EPCAM, MLH1, MSH2, MSH6, PALB2, STK11, and TP53).  Of note, Nga would no longer meet NCCN criteria for Grace syndrome genes and pancreatic cancer susceptibility genes mentioned above if her mother had negative genetic testing for these genes.      We discussed that there are additional genes that could cause increased risk for the cancers seen in Nga's family. As many of these genes present with  overlapping features in a family and accurate cancer risk cannot always be established based upon the pedigree analysis alone, it would be reasonable for Nga to consider panel genetic testing to analyze multiple genes at once.  We reviewed genetic testing options for Nga based on her personal and family history: a panel of genes associated with an increased risk for cancers seen in Nga's family or larger panel options to include genes associated with increased risk for multiple different cancer types.     After our discussion, Nga was interesting in pursing genetic testing. We discussed Nga meeting NCCN guidelines for Grace syndrome genes and pancreatic cancer susceptibility genes is dependent on her mother's test results. Given her mother reportedly had negative genetic testing, we discussed reviewing these results in order to determine the most appropriate testing options. Nga agreed to this plan and will gather these records from her father.     Medical Management: For Nga, we reviewed that the information from genetic testing may determine:    additional cancer screening for which Nga may qualify (i.e. mammogram and breast MRI, more frequent colonoscopies, more frequent dermatologic exams, etc.),    options for risk reducing surgeries Nga could consider (i.e. bilateral mastectomy, surgery to remove her ovaries and/or uterus, etc.),      and targeted chemotherapies if she were to develop certain cancers in the future (i.e. immunotherapy for individuals with Grace syndrome, PARP inhibitors, etc.).     These recommendations and possible targeted chemotherapies will be discussed in detail once genetic testing is completed.     Screening recommendations based upon personal/family history:       Based on her personal and family history, Nga has a 17.8% lifetime risk of developing breast cancer based on the JASIEL model. Therefore, Nga does not meet current National Comprehensive Cancer Network (NCCN)  guidelines for high risk breast screening, which is offered to women with a 20% lifetime risk or higher. However, it is still important for Nga to continue with routine breast screening under the care of her physicians. Breast cancer screening is generally recommended to begin approximately 10 years younger than the earliest age of breast cancer diagnosis in the family, or at age 40, whichever comes first. In this family, screening may begin at age 34. Nga is encouraged to discuss breast screening with her physicians.     Due to Nga s family history of pancreatic cancer, screening for pancreatic cancer may be considered. This screening typically involves endoscopic ultrasonography (EUS) and/or MRI/magnetic resonance cholangiopancreatography (Andrez et al, Gut 2013. 62: 339-347; Gabriel S, et al., Am J Gastroenterol 2015. 110:223-262). Given the significant limitations of this screening, Nga could consider meeting with her primary care provider to discuss this screening in more detail.     Final screening recommendations should be made by each individual's managing physician. Of note, these screening recommendations may change should Nga elect to pursue genetic testing in the future.    Plan:  1) Nga plans to reach out to me with once she receives a copy of her mother's genetic testing report.   2) After reviewing this information and if Nga decides to purse with genetic testing, we will schedule an appointment to review testing options, logistics surrounding testing, and review the consent form. My contact information was provided.   3) Information regarding recommended screening, based upon the family history, was reviewed for Nga.  4) Nga will contact me regularly and/or if the family or personal history changes.     Face to face time over video: 53 minutes    Yolanda Ralph MS  Genetic Counseling Intern  Email: [email or web address]    Attestation: Patient seen, evaluated and discussed with the  Genetic Counseling Intern. I have verified the content of the note, which accurately reflects my assessment of the patient and the plan of care.     Supervising Genetic Counselor  Alexy Rivas MS, [facility] [place]  Licensed, Certified Genetic Counselor  (P): [phone]  (F): [phone]

## 2021-08-06 ENCOUNTER — VIRTUAL VISIT (OUTPATIENT)
Dept: ONCOLOGY | Facility: CLINIC | Age: 36
End: 2021-08-06
Attending: GENETIC COUNSELOR, MS
Payer: COMMERCIAL

## 2021-08-06 DIAGNOSIS — Z80.49 FAMILY HISTORY OF MALIGNANT NEOPLASM OF UTERUS: ICD-10-CM

## 2021-08-06 DIAGNOSIS — Z80.3 FAMILY HISTORY OF MALIGNANT NEOPLASM OF BREAST: Primary | ICD-10-CM

## 2021-08-06 DIAGNOSIS — Z80.0 FAMILY HISTORY OF MALIGNANT NEOPLASM OF PANCREAS: ICD-10-CM

## 2021-08-06 DIAGNOSIS — Z80.8 FAMILY HISTORY OF MALIGNANT NEOPLASM OF THYROID: ICD-10-CM

## 2021-08-06 DIAGNOSIS — Z80.51 FAMILY HISTORY OF MALIGNANT NEOPLASM OF KIDNEY: ICD-10-CM

## 2021-08-06 PROCEDURE — 96040 HC GENETIC COUNSELING, EACH 30 MINUTES: CPT | Mod: GT | Performed by: GENETIC COUNSELOR, MS

## 2021-08-06 NOTE — Clinical Note
8/6/2021         RE: Nga Mckeon  [id number] [place] [address]        Dear Colleague,    Thank you for referring your patient, Nga Mckeon, to the [facility]. Please see a copy of my visit note below.    Nga is a 36 year old who is being evaluated via a billable video visit.      Video-Visit Details    Type of service: Video Visit    Video Start Time: 12:30  Video End Time: 1:23    Originating Location (pt. Location): Home    Distant Location (provider location): Cancer Risk Management Program    Platform used for Video Visit: [facility]    8/6/2021    Referring Provider: Kinjal Cohen MD    Presenting Information:   I met with Nga Mckeon today for her video genetic counseling visit through the Cancer Risk Management Program to discuss her family history of pancreatic, breast, thyroid, kidney and uterine cancer. She is here today to review this history, cancer screening recommendations, and available genetic testing options.    Personal History:  Nga is a 36 year old female. She  does not have any personal history of cancer.    In her hormonal-based medical history, she had her first menstrual period at age 13, her first child at age 31, and is premenopausal. Nga has her ovaries, fallopian tubes and uterus in place, and reports no ovarian cancer screening to date. She reports that she has no history of hormone replacement therapy.  She reports oral contraceptive use for approximately 3-5 years.       Nga had a mammogram when she was 31 years old due to family history, which was normal. Nga has not had nor colonoscopy. Nga denies any history of dermatological exams. She does not regularly do any other cancer screening at this time. Nga reported no history of smoking and occasional alcohol use.    Family History: (Please see scanned pedigree for detailed family history information)    Nga's mother was diagnosed with pancreatic cancer at age 63.      Maternal aunt was diagnosed with thyroid cancer at age 35.     Maternal grandmother was diagnosed with uterine cancer at age 60.     Maternal grandfather was diagnosed with kidney cancer at age 65.     Paternal grandmother was diagnosed with breast cancer in her 40's.     Her maternal ethnicity is ***. Her paternal ethnicity is ***.  There is no known Ashkenazi Judaism ancestry on either side of her family. There is no reported consanguinity.    Discussion:    Nga's personal and family history of *** is suggestive of a hereditary cancer syndrome.    We reviewed the features of sporadic, familial, and hereditary cancers. In looking at Nga's family history, it is possible that a cancer susceptibility gene is present due to ***.    We discussed the natural history and genetics of ***. A detailed handout regarding *** and the information we discussed was provided to Nga at the end of our appointment today and can be found in the after visit summary. Topics included: inheritance pattern, cancer risks, cancer screening recommendations, and also risks, benefits and limitations of testing.    ***    Based on her personal and family history, Nga meets current National Comprehensive Cancer Network (NCCN) criteria for genetic testing of ***.          We discussed that there are additional genes that could cause increased risk for *** cancer. As many of these genes present with overlapping features in a family and accurate cancer risk cannot always be established based upon the pedigree analysis alone, it would be reasonable for Nga to consider panel genetic testing to analyze multiple genes at once.  We reviewed genetic testing options for Nga based on her personal and family history: a panel of genes associated with an increased risk for ***certain, hereditary breast and gynecologic cancers*** cancers, or larger panel options to include genes associated with increased risk for multiple different cancer types. Nga  expressed interest in *** panel.             Medical Management: For Nga, we reviewed that the information from genetic testing may determine:    additional cancer screening for which Nga may qualify (i.e. mammogram and breast MRI, more frequent colonoscopies, more frequent dermatologic exams, etc.),    options for risk reducing surgeries Nga could consider (i.e. bilateral mastectomy, surgery to remove her ovaries and/or uterus, etc.),      and targeted chemotherapies if she were to develop certain cancers in the future (i.e. immunotherapy for individuals with Grace syndrome, PARP inhibitors, etc.).     These recommendations and possible targeted chemotherapies will be discussed in detail once genetic testing is completed.     Plan:  1) Today Nga elected to proceed with ***.  2) This information should be available in 4-6*** weeks.  3) Nga will return to clinic to discuss the results.    Face to face time over video: *** minutes    Yolanda Ralph MS  Genetic Counseling Intern  Email: [email or web address]    ***     Attestation: Patient seen, evaluated and discussed with the Genetic Counseling Intern. I have verified the content of the note, which accurately reflects my assessment of the patient and the plan of care.     Supervising Genetic Counselor  Alexy Rivas MS, [facility] [place]  Licensed, Certified Genetic Counselor  (P): [phone]  (F): [phone]        Again, thank you for allowing me to participate in the care of your patient.        Sincerely,        Alexy Rivas GC

## 2021-08-06 NOTE — LETTER
Cancer Risk Management  Program Locations    [facility]  [facility]  [facility]  [facility]  Mailing Address  Cancer Risk Management Program  [facility]  [address]  [place] [address]    New patient appointments  [phone]  August 6, 2021    Nga Mckeon  [address] [address] [address]      Dear Nga,    It was a pleasure speaking with you over video on August 6, 2021. Here is a copy of the progress note from our discussion. If you have any additional questions, please feel free to call.    Referring Provider: Kinjal Cohen MD    Presenting Information:   I met with Nga Mckeon today for her video genetic counseling visit through the Cancer Risk Management Program to discuss her family history of pancreatic, breast, thyroid, kidney and uterine cancer. She is here today to review this history, cancer screening recommendations, and available genetic testing options.    Personal History:  Nga is a 36 year old female. She  does not have any personal history of cancer. In her hormonal-based medical history, she had her first menstrual period at age 13, her first child at age 31, and is premenopausal. Nga has her ovaries, fallopian tubes and uterus in place, and reports no ovarian cancer screening to date. She reports that she has no history of hormone replacement therapy.  She reports oral contraceptive use for approximately 3-5 years.       Nga had a mammogram when she was 31 years old due to family history, which was normal. Nga has not had nor colonoscopy. Nga denies any history of dermatological exams. She does not regularly do any other cancer screening at this time. Nga reported no history of smoking and occasional alcohol use.          Family History: (Please see scanned pedigree for detailed family history  information)    Nga's mother was diagnosed with pancreatic cancer at age 63. Nga reports that her mother had negative genetic testing, however, her report was not available for review today. She passed away at age 64.     Maternal aunt was diagnosed with pancreatic cancer at age 79 and passed away shortly after due to complications of the disease.     Another maternal aunt was diagnosed with thyroid cancer at age 21. She is currently 67.     Maternal grandmother was diagnosed with uterine cancer at in her early 60's. She passed away at age 67.    Maternal grandfather was diagnosed with kidney cancer at age 67 and passed away shortly after due to complications of the disease.     Maternal great uncle (brother of maternal grandfather discussed above) was diagnosed with kidney cancer in his 60's and passed away in his 70's.     Paternal grandmother was diagnosed with breast cancer at age 44. She was later diagnosed with bone cancer in her late 70's. She passed away at age 78.     Of note, Nga's father and three paternal aunts have never been diagnosed with cancer.     Her maternal ethnicity is Sami, Faroese, and [place]an. Her paternal ethnicity is [place] and [place]. There is no known Ashkenazi Latter day ancestry on either side of her family. There is no reported consanguinity.    Discussion:    Nga's family history of pancreatic, breast, thyroid, kidney and uterine cancer is suggestive of a hereditary cancer syndrome.  We reviewed the features of sporadic, familial, and hereditary cancers. We discussed the natural history and genetics of several hereditary cancer syndromes, including Hereditary Breast and Ovarian Cancer Syndrome (HBOC) and Grace syndrome.   The most common cause of hereditary breast cancer is HBOC, which is caused by mutations in the BRCA1 and BRCA2 genes. Individuals with HBOC syndrome are at increased risk for several different cancers, including breast, ovarian, male breast, prostate,  melanoma, and pancreatic cancer. HBOC typically presents with multiple family members diagnosed with breast cancer before age 50 and/or ovarian cancer.   Grace syndrome can be caused by a mutation in one of five genes:  MLH1, MSH2, MSH6, PMS2, and EPCAM. A single mutation in one of the Grace Syndrome genes increases the risk for several cancers, such as colon cancer, endometrial/uterine cancer, gastric cancer, and ovarian cancer. Other cancers have also been reported in some families with Grace Syndrome include pancreatic, bladder, biliary tract, urothelial, small bowel, prostate, breast and brain cancers.  A detailed handout regarding information about HBOC and Grace syndrome and related hereditary cancer syndromes will be provided to Nga via [employer] and can be found in the after visit summary. Topics included: inheritance pattern, cancer risks, cancer screening recommendations, and also risks, benefits and limitations of testing.  In looking at Nga's personal and family history, it is possible that a cancer susceptibility gene is present due Nga's mother's diagnosis of pancreatic cancer, maternal aunt diagnosed with pancreatic cancer, maternal grandmother's diagnosis of uterine cancer, and paternal grandmother diagnosed with breast cancer at age 44.  Based on her family history, Nga meets current National Comprehensive Cancer Network (NCCN) criteria for genetic testing of high-penetrance breast and/or ovarian cancer susceptibility genes (including BRCA1, BRCA2, CDH1, PALB2, PTEN, and TP53).    Based on her family history, Nga meets current National Comprehensive Cancer Network (NCCN) criteria for genetic testing of Grace syndrome genes (i.e EPCAM, MLH1, MSH2, MSH6, PMS2).   Based on her family history, Nga meets current National Comprehensive Cancer Network (NCCN) criteria for genetic testing of pancreatic cancer susceptibility genes (KEITH, BRCA1, BRCA2, CDKN2A, EPCAM, MLH1, MSH2, MSH6, PALB2, STK11, and  TP53).  Of note, Nga would no longer meet NCCN criteria for Grace syndrome genes and pancreatic cancer susceptibility genes mentioned above if her mother had negative genetic testing for these genes.      We discussed that there are additional genes that could cause increased risk for the cancers seen in Nga's family. As many of these genes present with overlapping features in a family and accurate cancer risk cannot always be established based upon the pedigree analysis alone, it would be reasonable for Nga to consider panel genetic testing to analyze multiple genes at once.  We reviewed genetic testing options for Nga based on her personal and family history: a panel of genes associated with an increased risk for cancers seen in Nga's family or larger panel options to include genes associated with increased risk for multiple different cancer types.     After our discussion, Nga was interesting in pursing genetic testing. We discussed Nga meeting NCCN guidelines for Grace syndrome genes and pancreatic cancer susceptibility genes is dependent on her mother's test results. Given her mother reportedly had negative genetic testing, we discussed reviewing these results in order to determine the most appropriate testing options. Nga agreed to this plan and will gather these records from her father.     Medical Management: For Nga, we reviewed that the information from genetic testing may determine:    additional cancer screening for which Nga may qualify (i.e. mammogram and breast MRI, more frequent colonoscopies, more frequent dermatologic exams, etc.),    options for risk reducing surgeries Nga could consider (i.e. bilateral mastectomy, surgery to remove her ovaries and/or uterus, etc.),      and targeted chemotherapies if she were to develop certain cancers in the future (i.e. immunotherapy for individuals with Grace syndrome, PARP inhibitors, etc.).     These recommendations and possible targeted  chemotherapies will be discussed in detail once genetic testing is completed.     Screening recommendations based upon personal/family history:       Based on her personal and family history, Nga has a 17.8% lifetime risk of developing breast cancer based on the JASIEL model. Therefore, Nga does not meet current National Comprehensive Cancer Network (NCCN) guidelines for high risk breast screening, which is offered to women with a 20% lifetime risk or higher. However, it is still important for Nga to continue with routine breast screening under the care of her physicians. Breast cancer screening is generally recommended to begin approximately 10 years younger than the earliest age of breast cancer diagnosis in the family, or at age 40, whichever comes first. In this family, screening may begin at age 34. Nga is encouraged to discuss breast screening with her physicians.     Due to Nga s family history of pancreatic cancer, screening for pancreatic cancer may be considered. This screening typically involves endoscopic ultrasonography (EUS) and/or MRI/magnetic resonance cholangiopancreatography (Andrez et al, Gut 2013. 62: 339-347; Gabriel S, et al., Am J Gastroenterol 2015. 110:223-262). Given the significant limitations of this screening, Nga could consider meeting with her primary care provider to discuss this screening in more detail.     Final screening recommendations should be made by each individual's managing physician. Of note, these screening recommendations may change should Nga elect to pursue genetic testing in the future.    Plan:  1) Nga plans to reach out to me with once she receives a copy of her mother's genetic testing report.   2) After reviewing this information and if Nga decides to purse with genetic testing, we will schedule an appointment to review testing options, logistics surrounding testing, and review the consent form. My contact information was provided.   3) Information regarding  recommended screening, based upon the family history, was reviewed for Nga.  4) Nga will contact me regularly and/or if the family or personal history changes.     Face to face time over video: 53 minutes    Yolanda Ralph MS  Genetic Counseling Intern  Email: [email or web address]    Attestation: Patient seen, evaluated and discussed with the Genetic Counseling Intern. I have verified the content of the note, which accurately reflects my assessment of the patient and the plan of care.     Supervising Genetic Counselor  Alexy Rivas MS, [facility]  Licensed, Certified Genetic Counselor  (P): [phone]  (F): [phone]

## 2021-08-15 ENCOUNTER — HEALTH MAINTENANCE LETTER (OUTPATIENT)
Age: 36
End: 2021-08-15

## 2021-09-08 NOTE — PROGRESS NOTES
Nga is a 36 year old who is being evaluated via a billable video visit.      Video-Visit Details    Type of service: Video Visit    Video Start Time: 4:00  Video End Time: 4:15    Originating Location (pt. Location): Home    Distant Location (provider location): [facility] CANCER [facility], Cancer Risk Management Program    Platform used for Video Visit: Kelly    9/10/2021    Referring Provider: Kinjal Cohen MD    Presenting Information:   I met with Nga Mckeon for a follow-up visit today. She had previously been seen through the Cancer Risk Management Program on 8/6/2021. She is here today to discuss genetic testing options.     Discussion:    We previously reviewed the details of Nga's family and personal history. Please see the clinic note from our previous appointment on 8/6/21 for details.     It was reported in our previous appointment that Nga's mother had negative genetic testing. However, at the time, it was unknown what genes were included in her testing. Nga's mother's genetic testing report was available for review today. She had the Multi-Cancer panel through [employer] in 2018.  Because her mother had comprehensive testing of genes associated with pancreatic, breast, colon,uterine, etc., then it would be reasonable for Nga to only be tested for cancers related to her paternal family history.      In looking at Nga's paternal family history, it is possible that a cancer susceptibility gene is present due Nga's paternal grandmother diagnosed with breast cancer at age 44.    Based on her family history, Nga meets current National Comprehensive Cancer Network (NCCN) criteria for genetic testing of high-penetrance breast cancer susceptibility genes (including BRCA1, BRCA2, CDH1, PALB2, PTEN, and TP53).     We discussed that there are additional genes that could cause increased risk for breast cancer. As many of these genes present with overlapping features in a family and  accurate cancer risk cannot always be established based upon the pedigree analysis alone, it would be reasonable for Nga to consider panel genetic testing to analyze multiple genes at once.  We reviewed genetic testing options for Nga based on her personal and family history: a panel of genes associated with an increased risk for hereditary breast and gynecologic cancers or larger panel options to include genes associated with increased risk for multiple different cancer types. Nga expressed interest in  BRCA1 and BRCA2 with automatic reflex to the Common Hereditary Cancers panel.   Genetic testing is available for 47 genes associated with cancers of the breast, ovary, uterus, prostate and gastrointestinal system: [facility] Common Hereditary Cancers panel (APC, KEITH, AXIN2, BARD1, BMPR1A, BRCA1, BRCA2, BRIP1, CDH1, CDK4, CDKN2A, CHEK2, CTNNA1, DICER1, EPCAM, GREM1, HOXB13, KIT, MEN1, MLH1, MSH2, MSH3, MSH6, MUTYH, NBN, NF1, NTHL1, PALB2, PDGFRA, PMS2, POLD1, POLE, PTEN,RAD50, RAD51C, RAD51D, SDHA, SDHB, SDHC, SDHD, SMAD4, SMARCA4, STK11, TP53,TSC1, TSC2, VHL).    We discussed that many of these genes are associated with specific hereditary cancer syndromes and published management guidelines: Hereditary Breast and Ovarian Cancer syndrome (BRCA1, BRCA2), Grace syndrome (MLH1, MSH2, MSH6, PMS2, EPCAM), Familial Adenomatous Polyposis (APC), Hereditary Diffuse Gastric Cancer (CDH1), Familial Atypical Multiple Mole Melanoma syndrome (CDK4, CDKN2A), Multiple Endocrine Neoplasia type 1 (MEN1), Juvenile Polyposis syndrome (BMPR1A, SMAD4), Cowden syndrome (PTEN), Li Fraumeni syndrome (TP53), Hereditary Paraganglioma and Pheochromocytoma syndrome (SDHA, SDHB, SDHC, SDHD), Peutz-Jeghers syndrome (STK11), MUTYH Associated Polyposis (MUTYH), Tuberous sclerosis complex (TSC1, TSC2), Von Hippel-Lindau disease (VHL), and Neurofibromatosis type 1 (NF1).   The KEITH, AXIN2, BRIP1, CHEK2, GREM1, MSH3, NBN, NTHL1, PALB2, POLD1, POLE,  RAD51C, and RAD51D genes are associated with increased cancer risk and have published management guidelines for certain cancers.   The remaining genes (BARD1, CTNNA1, DICER1, HOXB13, KIT, PDGFRA, RAD50, and SMARCA4) are associated with increased cancer risk and may allow us to make medical recommendations when mutations are identified.     Due to COVID-Impinj restrictions, we now can send saliva kits from [employer] to patients. They will receive a kit directly to their home with directions on how to collect a saliva sample. We discussed that the success of the testing depends on how well she follows the directions and that there is a small chance for sample failure. Nga verbalized understanding of this. Once the sample is collected, she will send it to [employer] using the return envelope and prepaid shipping label.     Verbal consent was given over video and written on the consent form due to COVID-19 restrictions. Turnaround time is approximately 4-6 weeks once the lab receives the sample.     Medical Management: For Nga, we reviewed that the information from genetic testing may determine:    additional cancer screening for which Nga may qualify (i.e. mammogram and breast MRI, more frequent colonoscopies, more frequent dermatologic exams, etc.),    options for risk reducing surgeries Nga could consider (i.e. bilateral mastectomy, surgery to remove the ovaries and/or uterus, etc.),      and targeted chemotherapies if she were to develop certain cancers in the future (i.e. immunotherapy for individuals with Grace syndrome, PARP inhibitors, etc.).     These recommendations and possible targeted chemotherapies will be discussed in detail once genetic testing is completed.     Plan:  1) Today Nga elected to proceed with the BRCA1 and BRCA2 with automatic reflex to the Common Hereditary Panel through [facility]. Therefore, consent was reviewed and verbally obtained for this testing.   2) A saliva  [facility] and shipped back to them for her genetic testing.   3) The results should be available in 4-6 weeks after Esdras received the saliva kit.  4) Nga will either have a telephone visit or video visit to discuss the results.  Additional recommendations about screening will be made at that time.    Face to face time over video: 15 minutes    Yolanda Ralph MS  Genetic Counseling Intern  (P): [phone]    Attestation: Patient discussed with the Genetic Counseling Intern. I have verified the content of the note, which accurately reflects my assessment of the patient and the plan of care.     Supervising Genetic Counselor  Alexy Rivas MS, [facility]  Licensed, Certified Genetic Counselor  (P): [phone]  (F): [phone]

## 2021-09-10 ENCOUNTER — VIRTUAL VISIT (OUTPATIENT)
Dept: ONCOLOGY | Facility: CLINIC | Age: 36
End: 2021-09-10
Attending: GENETIC COUNSELOR, MS
Payer: COMMERCIAL

## 2021-09-10 DIAGNOSIS — Z80.51 FAMILY HISTORY OF MALIGNANT NEOPLASM OF KIDNEY: ICD-10-CM

## 2021-09-10 DIAGNOSIS — Z80.8 FAMILY HISTORY OF MALIGNANT NEOPLASM OF THYROID: ICD-10-CM

## 2021-09-10 DIAGNOSIS — Z80.3 FAMILY HISTORY OF MALIGNANT NEOPLASM OF BREAST: Primary | ICD-10-CM

## 2021-09-10 DIAGNOSIS — Z80.49 FAMILY HISTORY OF MALIGNANT NEOPLASM OF UTERUS: ICD-10-CM

## 2021-09-10 DIAGNOSIS — Z80.0 FAMILY HISTORY OF MALIGNANT NEOPLASM OF PANCREAS: ICD-10-CM

## 2021-09-10 PROCEDURE — 999N000069 HC STATISTIC GENETIC COUNSELING, < 16 MIN: Mod: GT,95 | Performed by: GENETIC COUNSELOR, MS

## 2021-09-10 NOTE — Clinical Note
9/10/2021         RE: Nga Mckeon  [address] [address] [address]        Dear Colleague,    Thank you for referring your patient, Nga Mckeon, to the [facility]. Please see a copy of my visit note below.    Nga is a 36 year old who is being evaluated via a billable video visit.      Video-Visit Details    Type of service: Video Visit    Video Start Time: 4:00  Video End Time: ***    Originating Location (pt. Location): Home    Distant Location (provider location): [facility] CANCER [facility], Cancer Risk Management Program    Platform used for Video Visit: Total [employer]    9/10/2021    Referring Provider: Kinjal Cohen MD    Presenting Information:   I met with Nga Mckeon for a follow-up visit today. She had previously been seen thorugh the Cancer Risk Management Program on 8/6/2021. She is here today to discuss genetic testing options.     Discussion:    We previously reviewed the details of Nga's family and personal history. Please see the clinic note from our previous appointment for details.     It was reported in our previous appointment that Nga's mother had negative genetic testing. However, at the time, it was unknown what genes were included in her testing. Nga's mother's genetic testing report was available for review today. She had the Multi-Cancer panel through [email or web address] in 2018.  Because her mother had comprehensive testing of genes associated with pancreatic, breast, colon,uterine, etc., then it would be reasonable for Nga to be tested for cancers related to her paternal family history.      In looking at Nga's paternal family history, it is possible that a cancer susceptibility gene is present due Nga's paternal grandmother diagnosed with breast cancer at age 44.    Based on her family history, Nga meets current National Comprehensive Cancer Network (NCCN) criteria for genetic testing of high-penetrance breast cancer  susceptibility genes (including BRCA1, BRCA2, CDH1, PALB2, PTEN, and TP53).     We discussed that there are additional genes that could cause increased risk for breast  cancer. As many of these genes present with overlapping features in a family and accurate cancer risk cannot always be established based upon the pedigree analysis alone, it would be reasonable for Nga to consider panel genetic testing to analyze multiple genes at once.  We reviewed genetic testing options for Nga based on her pkersonal and family history: a panel of genes associated with an increased risk for  hereditary breast and gynecologic cancers or larger panel options to include genes associated with increased risk for multiple different cancer types. Nga expressed interest in  BRCA1 and BRCA2 with automatic reflex to the Common Hereditary Cancers panel.   Genetic testing is available for 47 genes associated with cancers of the breast, ovary, uterus, prostate and gastrointestinal system: [facility] Common Hereditary Cancers panel (APC, KEITH, AXIN2, BARD1, BMPR1A, BRCA1, BRCA2, BRIP1, CDH1, CDK4, CDKN2A, CHEK2, CTNNA1, DICER1, EPCAM, GREM1, HOXB13, KIT, MEN1, MLH1, MSH2, MSH3, MSH6, MUTYH, NBN, NF1, NTHL1, PALB2, PDGFRA, PMS2, POLD1, POLE, PTEN,RAD50, RAD51C, RAD51D, SDHA, SDHB, SDHC, SDHD, SMAD4, SMARCA4, STK11, TP53,TSC1, TSC2, VHL).    We discussed that many of these genes are associated with specific hereditary cancer syndromes and published management guidelines: Hereditary Breast and Ovarian Cancer syndrome (BRCA1, BRCA2), Grace syndrome (MLH1, MSH2, MSH6, PMS2, EPCAM), Familial Adenomatous Polyposis (APC), Hereditary Diffuse Gastric Cancer (CDH1), Familial Atypical Multiple Mole Melanoma syndrome (CDK4, CDKN2A), Multiple Endocrine Neoplasia type 1 (MEN1), Juvenile Polyposis syndrome (BMPR1A, SMAD4), Cowden syndrome (PTEN), Li Fraumeni syndrome (TP53), Hereditary Paraganglioma and Pheochromocytoma syndrome (SDHA, SDHB, SDHC, SDHD),  Peutz-Jeghers syndrome (STK11), MUTYH Associated Polyposis (MUTYH), Tuberous sclerosis complex (TSC1, TSC2), Von Hippel-Lindau disease (VHL), and Neurofibromatosis type 1 (NF1).   The KEITH, AXIN2, BRIP1, CHEK2, GREM1, MSH3, NBN, NTHL1, PALB2, POLD1, POLE, RAD51C, and RAD51D genes are associated with increased cancer risk and have published management guidelines for certain cancers.   The remaining genes (BARD1, CTNNA1, DICER1, HOXB13, KIT, PDGFRA, RAD50, and SMARCA4) are associated with increased cancer risk and may allow us to make medical recommendations when mutations are identified.   Verbal consent was given over the phone and written on the consent form due to COVID-19 restrictions. Turnaround time is approximately 4-6 weeks once the lab receives the sample.    Medical Management: For Nga, we reviewed that the information from genetic testing may determine:    additional cancer screening for which Nga may qualify (i.e. mammogram and breast MRI, more frequent colonoscopies, more frequent dermatologic exams, etc.***),    options for risk reducing surgeries Nga could consider (i.e. bilateral mastectomy, surgery to remove the ovaries and/or uterus, etc.***),      and targeted chemotherapies if she were to develop certain cancers in the future (i.e. immunotherapy for individuals with Grace syndrome, PARP inhibitors, etc.***).     These recommendations and possible targeted chemotherapies will be discussed in detail once genetic testing is completed.     Plan:  1) Today Nga elected to proceed with the BRCA1 and BRCA2 with automatic reflex to the Common Hereditary Panel through [facility]. Therefore, consent was reviewed and verbally obtained for this testing.   2) Nga plans to schedule her blood draw appointment at a clinic that is convenient to her.   3) The results should be available in 4-6 weeks after her blood is drawn.  4) Nga will either have a telephone visit or video visit to discuss the  results.  Additional recommendations about screening will be made at that time.      Face to face time over video: 15 minutes    Yolanda Ralph MS  Genetic Counseling Intern  (P): [phone]    ***    Attestation: Patient discussed with the Genetic Counseling Intern. I have verified the content of the note, which accurately reflects my assessment of the patient and the plan of care.     Supervising Genetic Counselor  Alexy Rivas MS, [facility] [place]  Licensed, Certified Genetic Counselor  (P): [phone]  (F): [phone]            Again, thank you for allowing me to participate in the care of your patient.        Sincerely,        Alexy Rivas, GC

## 2021-09-10 NOTE — LETTER
Cancer Risk Management  Program Locations    [facility]  [facility]  [facility]  Mailing Address  Cancer Risk Management Program  [facility]  [address]  [place] [address]    New patient appointments  [phone]  September 10, 2021     Nga Mckeon  [address] [address]    Dear Nga,    It was a pleasure speaking with you over video on September 10, 2021. Here is a copy of the progress note from our discussion. If you have any additional questions, please feel free to call.    Referring Provider: Kinjal Cohen MD    Presenting Information:   I met with Nga Mckeon for a follow-up visit today. She had previously been seen through the Cancer Risk Management Program on 8/6/2021. She is here today to discuss genetic testing options.     Discussion:    We previously reviewed the details of Nga's family and personal history. Please see the clinic note from our previous appointment for details.     It was reported in our previous appointment that Nga's mother had negative genetic testing. However, at the time, it was unknown what genes were included in her testing. Nga's mother's genetic testing report was available for review today. She had the Multi-Cancer panel through [facility] in 2018.  Because her mother had comprehensive testing of genes associated with pancreatic, breast, colon,uterine, etc., then it would be reasonable for Nga to only be tested for cancers related to her paternal family history.      In looking at Nga's paternal family history, it is possible that a cancer susceptibility gene is present due Nga's paternal grandmother diagnosed with breast cancer at age 44.    Based on her family history, Nga meets current National Comprehensive Cancer Network (NCCN) criteria for  genetic testing of high-penetrance breast cancer susceptibility genes (including BRCA1, BRCA2, CDH1, PALB2, PTEN, and TP53).     We discussed that there are additional genes that could cause increased risk for breast cancer. As many of these genes present with overlapping features in a family and accurate cancer risk cannot always be established based upon the pedigree analysis alone, it would be reasonable for Nga to consider panel genetic testing to analyze multiple genes at once.  We reviewed genetic testing options for Nga based on her personal and family history: a panel of genes associated with an increased risk for hereditary breast and gynecologic cancers or larger panel options to include genes associated with increased risk for multiple different cancer types. Nga expressed interest in  BRCA1 and BRCA2 with automatic reflex to the Common Hereditary Cancers panel.   Genetic testing is available for 47 genes associated with cancers of the breast, ovary, uterus, prostate and gastrointestinal system: [facility] Common Hereditary Cancers panel (APC, KEITH, AXIN2, BARD1, BMPR1A, BRCA1, BRCA2, BRIP1, CDH1, CDK4, CDKN2A, CHEK2, CTNNA1, DICER1, EPCAM, GREM1, HOXB13, KIT, MEN1, MLH1, MSH2, MSH3, MSH6, MUTYH, NBN, NF1, NTHL1, PALB2, PDGFRA, PMS2, POLD1, POLE, PTEN,RAD50, RAD51C, RAD51D, SDHA, SDHB, SDHC, SDHD, SMAD4, SMARCA4, STK11, TP53,TSC1, TSC2, VHL).    We discussed that many of these genes are associated with specific hereditary cancer syndromes and published management guidelines: Hereditary Breast and Ovarian Cancer syndrome (BRCA1, BRCA2), Grace syndrome (MLH1, MSH2, MSH6, PMS2, EPCAM), Familial Adenomatous Polyposis (APC), Hereditary Diffuse Gastric Cancer (CDH1), Familial Atypical Multiple Mole Melanoma syndrome (CDK4, CDKN2A), Multiple Endocrine Neoplasia type 1 (MEN1), Juvenile Polyposis syndrome (BMPR1A, SMAD4), Cowden syndrome (PTEN), Li Fraumeni syndrome (TP53), Hereditary Paraganglioma and  Pheochromocytoma syndrome (SDHA, SDHB, SDHC, SDHD), Peutz-Jeghers syndrome (STK11), MUTYH Associated Polyposis (MUTYH), Tuberous sclerosis complex (TSC1, TSC2), Von Hippel-Lindau disease (VHL), and Neurofibromatosis type 1 (NF1).   The KEITH, AXIN2, BRIP1, CHEK2, GREM1, MSH3, NBN, NTHL1, PALB2, POLD1, POLE, RAD51C, and RAD51D genes are associated with increased cancer risk and have published management guidelines for certain cancers.   The remaining genes (BARD1, CTNNA1, DICER1, HOXB13, KIT, PDGFRA, RAD50, and SMARCA4) are associated with increased cancer risk and may allow us to make medical recommendations when mutations are identified.     Due to COVID-[employer] restrictions, we now can send saliva kits from [facility] to patients. They will receive a kit directly to their home with directions on how to collect a saliva sample. We discussed that the success of the testing depends on how well she follows the directions and that there is a small chance for sample failure. Nga verbalized understanding of this. Once the sample is collected, she will send it to [facility] using the return envelope and prepaid shipping label.     Verbal consent was given over video and written on the consent form due to COVID-19 restrictions. Turnaround time is approximately 4-6 weeks once the lab receives the sample.     Medical Management: For Nga, we reviewed that the information from genetic testing may determine:    additional cancer screening for which Nga may qualify (i.e. mammogram and breast MRI, more frequent colonoscopies, more frequent dermatologic exams, etc.),    options for risk reducing surgeries Nga could consider (i.e. bilateral mastectomy, surgery to remove the ovaries and/or uterus, etc.),      and targeted chemotherapies if she were to develop certain cancers in the future (i.e. immunotherapy for individuals with Grace syndrome, PARP inhibitors, etc.).     These recommendations and possible targeted  chemotherapies will be discussed in detail once genetic testing is completed.     Plan:  1) Today Nga elected to proceed with the BRCA1 and BRCA2 with automatic reflex to the Common Hereditary Panel through [facility]. Therefore, consent was reviewed and verbally obtained for this testing.   2) A saliva kit will be mailed to Nga's house from [facility] and shipped back to them for her genetic testing.   3) The results should be available in 4-6 weeks after Esdras received the saliva kit.  4) Nga will either have a telephone visit or video visit to discuss the results.  Additional recommendations about screening will be made at that time.    Face to face time over video: 15 minutes    Yolanda Ralph MS  Genetic Counseling Intern  (P): [phone]    Attestation: Patient discussed with the Genetic Counseling Intern. I have verified the content of the note, which accurately reflects my assessment of the patient and the plan of care.     Supervising Genetic Counselor  Alexy Rivas MS, [facility]  Licensed, Certified Genetic Counselor  (P): [phone]  (F): [phone]

## 2021-09-18 NOTE — PATIENT INSTRUCTIONS
Assessing Cancer Risk  Only about 5-10% of cancers are thought to be due to an inherited cancer susceptibility gene.    These families often have:    Several people with the same or related types of cancer    Cancers diagnosed at a young age (before age 50)    Individuals with more than one primary cancer    Multiple generations of the family affected with cancer    Some people may be candidates for genetic testing of more than one gene.  For these families, genetic testing using a cancer panel may be offered.  These panels will test different genes known to increase the risk for breast, ovarian, uterine, and/or other cancers. All of the genes discussed below have published clinical management guidelines for individuals who are found to carry a mutation. The purpose of this handout is to serve as a brief summary of the genes analyzed by the panels used to inquire about hereditary breast and gynecologic cancer:  KEITH, BRCA1, BRCA2, BRIP1, CDH1, CHEK2, MLH1, MSH2, MSH6, PMS2, EPCAM, PTEN, PALB2, RAD51C, RAD51D, and TP53.  ______________________________________________________________________________  Hereditary Breast and Ovarian Cancer Syndrome   (BRCA1 and BRCA2)  A single mutation in one of the copies of BRCA1 or BRCA2 increases the risk for breast and ovarian cancer, among others.  The risk for pancreatic cancer and melanoma may also be slightly increased in some families.  The chart below shows the chance that someone with a BRCA mutation would develop cancer in his or her lifetime1,2,3,4.        A person s ethnic background is also important to consider, as individuals of Ashkenazi Restorationism ancestry have a higher chance of having a BRCA gene mutation.  There are three BRCA mutations that occur more frequently in this population.    Grace Syndrome   (MLH1, MSH2, MSH6, PMS2, and EPCAM)  Currently five genes are known to cause Grace Syndrome: MLH1, MSH2, MSH6, PMS2, and EPCAM.  A single mutation in one of the  Grace Syndrome genes increases the risk for colon, endometrial, ovarian, and stomach cancers.  Other cancers that occur less commonly in Grace Syndrome include urinary tract, skin, and brain cancers.  The chart below shows the chance that a person with Grace syndrome would develop cancer in his or her lifetime5.      *Cancer risk varies depending on Grace syndrome gene found    Cowden Syndrome   (PTEN)  Cowden syndrome is a hereditary condition that increases the risk for breast, thyroid, endometrial, colon, and kidney cancer.  Cowden syndrome is caused by a mutation in the PTEN gene.  A single mutation in one of the copies of PTEN causes Cowden syndrome and increases cancer risk.  The chart below shows the chance that someone with a PTEN mutation would develop cancer in their lifetime6,7.  Other benign features seen in some individuals with Cowden syndrome include benign skin lesions (facial papules, keratoses, lipomas), learning disability, autism, thyroid nodules, colon polyps, and larger head size.      *One recent study found breast cancer risk to be increased to 85%    Li-Fraumeni Syndrome   (TP53)  Li-Fraumeni Syndrome (LFS) is a cancer predisposition syndrome caused by a mutation in the TP53 gene. A single mutation in one of the copies of TP53 increases the risk for multiple cancers. Individuals with LFS are at an increased risk for developing cancer at a young age. The lifetime risk for development of a LFS-associated cancer is 50% by age 30 and 90% by age 60.   Core Cancers: Sarcomas, Breast, Brain, Lung, Leukemias/Lymphomas, Adrenocortical carcinomas  Other Cancers: Gastrointestinal, Thyroid, Skin, Genitourinary    Hereditary Diffuse Gastric Cancer   (CDH1)  Currently, one gene is known to cause hereditary diffuse gastric cancer (HDGC): CDH1.  Individuals with HDGC are at increased risk for diffuse gastric cancer and lobular breast cancer. Of people diagnosed with HDGC, 30-50% have a mutation in the CDH1  gene.  This suggests there are likely other genes that may cause HDGC that have not been identified yet.      Lifetime Cancer Risks    General Population HDGC    Diffuse Gastric  <1% ~80%   Breast 12% 39-52%         Additional Genes  KEITH  KEITH is a moderate-risk breast cancer gene. Women who have a mutation in KEITH can have between a 2-4 fold increased risk for breast cancer compared to the general population8. KEITH mutations have also been associated with increased risk for pancreatic cancer, however an estimate of this cancer risk is not well understood9. Individuals who inherit two KEITH mutations have a condition called ataxia-telangiectasia (AT).  This rare autosomal recessive condition affects the nervous system and immune system, and is associated with progressive cerebellar ataxia beginning in childhood.  Individuals with ataxia-telangiectasia often have a weakened immune system and have an increased risk for childhood cancers.    PALB2  Mutations in PALB2 have been shown to increase the risk of breast cancer up to 33-58% in some families; where individuals fall within this risk range is dependent upon family [id number]. PALB2 mutations have also been associated with increased risk for pancreatic cancer, although this risk has not been quantified yet.  Individuals who inherit two PALB2 mutations--one from their mother and one from their father--have a condition called Fanconi Anemia.  This rare autosomal recessive condition is associated with short stature, developmental delay, bone marrow failure, and increased risk for childhood cancers.    CHEK2   CHEK2 is a moderate-risk breast cancer gene.  Women who have a mutation in CHEK2 have around a 2-fold increased risk for breast cancer compared to the general population, and this risk may be higher depending upon family history.11,12,13 Mutations in CHEK2 have also been shown to increase the risk of a number of other cancers, including colon and prostate, however  these cancer risks are currently not well understood.    BRIP1, RAD51C and RAD51D  Mutations in BRIP1, RAD51C, and RAD51D have been shown to increase the risk of ovarian cancer and possibly female breast cancer as well14,15 .       Lifetime Cancer Risk    General Population BRIP1 RAD51C RAD51D   Ovarian 1-2% ~5-8% ~5-9% ~7-15%           Inheritance  All of the cancer syndromes reviewed above are inherited in an autosomal dominant pattern.  This means that if a parent has a mutation, each of his or her children will have a 50% chance of inheriting that same mutation.  Therefore, each child--male or female--would have a 50% chance of being at increased risk for developing cancer.      Image obtained from Genetics Home Reference, 2013     Mutations in some genes can occur de carmenza, which means that a person s mutation occurred for the first time in them and was not inherited from a parent.  Now that they have the mutation, however, it can be passed on to future generations.    Genetic Testing  Genetic testing involves a blood test and will look at the genetic information in the KEITH, BRCA1, BRCA2, BRIP1, CDH1, CHEK2, MLH1, MSH2, MSH6, PMS2, EPCAM, PTEN, PALB2, RAD51C, RAD51D, and TP53 genes for any harmful mutations that are associated with increased cancer risk.  If possible, it is recommended that the person(s) who has had cancer be tested before other family members.  That person will give us the most useful information about whether or not a specific gene is associated with the cancer in the family.    Results  There are three possible results of genetic testing:    Positive--a harmful mutation was identified in one or more of the genes    Negative--no mutation was identified in any of the genes on this panel    Variant of unknown significance--a variation in one of the genes was identified, but it is unclear how this impacts cancer risk in the family    Advantages and Disadvantages   There are advantages and  disadvantages to genetic testing.    Advantages    May clarify your cancer risk    Can help you make medical decisions    May explain the cancers in your family    May give useful information to your family members (if you share your results)    Disadvantages    Possible negative emotional impact of learning about inherited cancer risk    Uncertainty in interpreting a negative test result in some situations    Possible genetic discrimination concerns (see below)    Genetic Information Nondiscrimination Act (EMERITA)  EMERITA is a federal law that protects individuals from health insurance or employment discrimination based on a genetic test result alone.  Although rare, there are currently no legal discrimination protections in terms of life insurance, long term care, or disability insurances.  Visit the [employer] website to learn more.    Reducing Cancer Risk  All of the genes described above have nationally recognized cancer screening guidelines that would be recommended for individuals who test positive.  In addition to increased cancer screening, surgeries may be offered or recommended to reduce cancer risk.  Recommendations are based upon an individual s genetic test result as well as their personal and family history of cancer.    Questions to Think About Regarding Genetic Testing:    What effect will the test result have on me and my relationship with my family members if I have an inherited gene mutation?  If I don t have a gene mutation?    Should I share my test results, and how will my family react to this news, which may also affect them?    Are my children ready to learn new information that may one day affect their own health?    Hereditary Cancer Resources    FORCE: Facing Our Risk of Cancer Empowered facingourrisk.org   Bright Pink bebrightpink.org   Li-Fraumeni Syndrome Association lfsassociation.org   PTEN World PTENworld.com   No stomach for cancer, Inc.  nostomachforcancer.org   Stomach cancer relief network Scrnet.org   Collaborative Group of the Americas on Inherited Colorectal Cancer (CGA) cgaicc.com    Cancer Care cancercare.org   American Cancer Society (ACS) cancer.org   National Cancer [place] (NCI) cancer.gov     Please call us if you have any questions or concerns.   Cancer Risk Management Program [phone] ([phone])  ? Alexy Rivas, MS, [facility] [phone]  ? Latesha Mendoza, MS, [facility]  [phone]  ? Michelle Sinclair, MS, [facility]  [phone]  ? Yuliana Liborio, MS, [facility] [phone]  ? Minerva Zari, MS, [facility] [phone]  ? Niurka Lux, MS, [facility]  [phone]  ? Yolanda Ralph, MS  [phone]    References  1. Cezar BYNUM, Lela PDP, Anais S, Nehal LINDSEY, Shefali JE, Ralf JL, Janiya N, Jarad H, Gama O, Rekha A, Tanner B, Radiparviz P, Manbrooklynn S, Sonja DM, Adams N, Sindi E, Kandace H, Matthew E, Russell J, Groniqra J, Andrei B, Victoriano H, Thorlacius S, Eerola H, Nevclovisna H, Madhavi K, Meredith OP. Average risks of breast and ovarian cancer associated with BRCA1 or BRCA2 mutations detected in case series unselected for family history: a combined analysis of 222 studies. Am J Hum Jenniffer. 2003;72:1117-30.  2. Fuentes N, Herminia M, Dari G.  BRCA1 and BRCA2 Hereditary Breast and Ovarian Cancer. Gene Reviews online. 2013.  3. Sherman YC, Arianne S, Kalani G, Roth S. Breast cancer risk among male BRCA1 and BRCA2 mutation carriers. J Natl Cancer Inst. 2007;99:1811-4.  4. Herson LAST, Jim I, Kennedy J, Alley E, Rafi ER, Juliet F. Risk of breast cancer in male BRCA2 carriers. J Med Jenniffer. 2010;47:710-1.  5. National Comprehensive Cancer Network. Clinical practice guidelines in oncology, colorectal cancer screening. Available online (registration required). 2015.  6. Jeff PALMA, Tom J, Mark J, Amber LA, Abigail SCHULER, Eng C. Lifetime cancer risks in individuals with germline PTEN mutations. Clin Cancer Res. 2012;18:400-7.  7. Pilarski R. Cowden  Syndrome: A Critical Review of the Clinical Literature. J Jenniffer . 2009:18:13-27.  8. Romi A, Tal D, Fernando S, Sharmin P, Issac T, Chacha M, Phan B, Marguerite H, Raj R, Narcisa K, Taqueria L, Herson DG, Sonja D, Boaz DF, Milka MR, The Breast Cancer Susceptibility Collaboration (UK) & Geovanna OWENS. KEITH mutations that cause ataxia-telangiectasia are breast cancer susceptibility alleles. Nature Genetics. 2006;38:873-875  9. Griffin N , Janeen Y, Temi J, Nathanael L, Sulema GM , Brady ML, Gallinger S, Ellis AG, Syngal S, Sophia ML, Dallin J , Mihai R, Kait SZ, Dalia JR, Jordyn VE, Ely M, Vodevyn B, Jasper N, Sofia RH, Aftab KW, and Amisha AP. KEITH mutations in patients with hereditary pancreatic cancer. Cancer Discover. 2012;2:41-46  10. Cezar CASTELLANO, et al. Breast-Cancer Risk in Families with Mutations in PALB2. NEJM. 2014; 371(6):497-506.  11. CHEK2 Breast Cancer Case-Control Consortium. CHEK2*1100delC and susceptibility to breast cancer: A collaborative analysis involving 10,860 breast cancer cases and 9,065 controls from 10 studies. Am J Hum Jenniffer, 74 (2004), pp. 6068-5999  12. Ty T, Melvi S, Chele K, et al. Spectrum of Mutations in BRCA1, BRCA2, CHEK2, and TP53 in Families at High Risk of Breast Cancer. MARY. 2006;295(12):8791-5826.   13. Elysia C, Bonny D, Ricarda A, et al. Risk of breast cancer in women with a CHEK2 mutation with and without a family history of breast cancer. J Clin Oncol. 2011;29:2033-0024.  14. Poncho H, Julissa E, Dennis SJ, et al. Contribution of germline mutations in the RAD51B, RAD51C, and RAD51D genes to ovarian cancer in the population. J Clin Oncol. 2015;33(26):6417-3902. Doi:10.1200/JCO.2015.61.2408.  15. Gilles T, Yobani ALEXANDRE, Charity P, et al. Mutations in BRIP1 confer high risk of ovarian cancer. Pricila Jenniffer. 2011;43(11):4337-6005. doi:10.1038/ng.955.

## 2021-10-10 ENCOUNTER — HEALTH MAINTENANCE LETTER (OUTPATIENT)
Age: 36
End: 2021-10-10

## 2021-11-24 NOTE — PROGRESS NOTES
"12/3/2021    Referring Provider: Kinajl Cohen MD    Presenting Information:  I spoke to Nga today to discuss her genetic testing results. Her blood was drawn on 10/26/2021. BRCA1 and BRCA2 with automatic reflex to the Common Hereditary Cancers panel was ordered from [facility]. This testing was done because of Nga's family history of multiple cancer types.    Genetic Testing Result: NEGATIVE  Nga is negative for mutations in APC, KEITH, AXIN2, BARD1, BMPR1A, BRCA1, BRCA2, BRIP1, CDH1, CDK4, CDKN2A, CHEK2, CTNNA1, DICER1, EPCAM, GREM1, HOXB13, KIT, MEN1, MLH1, MSH2, MSH3, MSH6, MUTYH, NBN, NF1, NTHL1, PALB2, PDGFRA, PMS2, POLD1, POLE, PTEN, RAD50, RAD51C, RAD51D, SDHA, SDHB, SDHC, SDHD, SMAD4, SMARCA4, STK11, TP53, TSC1, TSC2, and VHL. No mutations were found in any of the 47 genes analyzed. This test involved sequencing and deletion/duplication analysis of all genes with the exceptions of EPCAM and GREM1 (deletions/duplications only) and SDHA (sequencing only).    A copy of the test report can be found in the Laboratory tab, dated 10/26/2021, and named \"LABORATORY MISCELLANEOUS ORDER\". The report is scanned in as a linked document.    Interpretation:  We discussed several different interpretations of this negative test result.    1. One explanation may be that there is a different gene or combination of genes and environment that are associated with the cancers in this family.  2. There is also a small possibility that there is a mutation in one of these genes, and the testing laboratory could not find it with their current testing methods.       Screening:  Based on this negative test result, it is important for Nga and her relatives to refer back to the family history for appropriate cancer screening.      Based on her personal and family history, Nga has a 18.1% lifetime risk of developing breast cancer based on the JASIEL model. Therefore, Nga does not meet current National Comprehensive Cancer Network (NCCN) " guidelines for high risk breast screening, which is offered to women with a 20% lifetime risk or higher. However, it is still important for Nga to continue with routine breast screening under the care of her physicians. Breast cancer screening is generally recommended to begin approximately 10 years younger than the earliest age of breast cancer diagnosis in the family, or at age 40, whichever comes first. In this family, screening may begin at age 34. Nga is encouraged to discuss breast screening with her physicians.   Due to Nga s family history of pancreatic cancer, screening for pancreatic cancer may be considered. This screening typically involves endoscopic ultrasonography (EUS) and/or MRI/magnetic resonance cholangiopancreatography (Andrez et al, Gut 2013. 62: 339-347; Gabriel S, et al., Am J Gastroenterol 2015. 110:223-262). Given the significant limitations of this screening, Nga could consider meeting with her primary care provider to discuss this screening in more detail. We discussed that based on Nga's family history, this screening could be consider at age 50.    Other population cancer screening options, such as those recommended by the American Cancer Society and the National Comprehensive Cancer Network (NCCN), are also appropriate for Nga and her family. These screening recommendations may change if there are changes to Nga's personal and/or family history of cancer.    Final screening recommendations should be made by each individual's primary care provider.    Inheritance:  We reviewed the autosomal dominant inheritance. We discussed that Nga did not/cannot pass on an identifiable mutation in these genes to her children based on this test result. Mutations in these genes do not skip generations.      Additional Testing Considerations:  Although Nga's genetic testing result was negative, other relatives may still carry a gene mutation associated with cancer. Genetic counseling is  recommended for Nga's maternal and paternal relatives to discuss genetic testing options. If any of these relatives do pursue genetic testing, Nga is encouraged to contact me so that we may review the impact of their test results on her.    Summary:  While no genetic changes were identified, Nga may still be at risk for certain cancers due to family history, environmental factors, or other genetic causes not identified by this test.  Because of that, it is important that she continue with cancer screening based on her personal and family history as discussed above.    Genetic testing is rapidly advancing, and new cancer susceptibility genes will most likely be identified in the future.  Therefore, I encouraged Nga to contact me annually or if there are changes in her personal or family history.  This may change how we assess her cancer risk, screening, and the testing we would offer.    Plan:  1. A copy of the test results will be mailed to Nga.  2. She plans to follow-up with Dr. Cohen.  3. She should contact me regularly, or sooner if her family history changes.    If Nga has any further questions, I encouraged her to contact me at  [phone].    Time spent on the video: 10 minutes.    Alexy Rivas MS, [facility]  Licensed, Certified Genetic Counselor  (P): [phone]  (F): [phone]   s/p Lung resecton. Goal is improved healing, final pathology. Walk 4-5 x per day. Increase as tolerated. You may climb stairs. Continue to use incentive spirometer.   You may remove all dressings tonight and begin to shower. Leave wounds uncovered.   Suture will be removed in office.  See Dr. Alvarez in 7-10 days. Call for an apt. [phone]

## 2021-12-03 ENCOUNTER — VIRTUAL VISIT (OUTPATIENT)
Dept: ONCOLOGY | Facility: CLINIC | Age: 36
End: 2021-12-03
Attending: GENETIC COUNSELOR, MS
Payer: COMMERCIAL

## 2021-12-03 DIAGNOSIS — Z80.3 FAMILY HISTORY OF MALIGNANT NEOPLASM OF BREAST: Primary | ICD-10-CM

## 2021-12-03 DIAGNOSIS — Z80.49 FAMILY HISTORY OF MALIGNANT NEOPLASM OF UTERUS: ICD-10-CM

## 2021-12-03 DIAGNOSIS — Z80.0 FAMILY HISTORY OF MALIGNANT NEOPLASM OF PANCREAS: ICD-10-CM

## 2021-12-03 DIAGNOSIS — Z80.51 FAMILY HISTORY OF MALIGNANT NEOPLASM OF KIDNEY: ICD-10-CM

## 2021-12-03 DIAGNOSIS — Z80.8 FAMILY HISTORY OF MALIGNANT NEOPLASM OF THYROID: ICD-10-CM

## 2021-12-03 PROCEDURE — 999N000069 HC STATISTIC GENETIC COUNSELING, < 16 MIN: Mod: GT,95 | Performed by: GENETIC COUNSELOR, MS

## 2021-12-03 NOTE — Clinical Note
"Hello,    Please enclose a copy of the test report from the laboratory tab titled \"laboratory miscellaneous order\" dated 10/26/21 (Order [id number]) to send to the patient along with the letter.    Referring provider: please see summary of genetic test results below.    Thank you,  Alexy"

## 2021-12-03 NOTE — LETTER
"    Cancer Risk Management  Program [facility]  [facility]  [facility]  Mailing Address  Cancer Risk Management Program  [address]  [place] [address]    New patient appointments  [phone]  December 3, 2021    Nga Mckeon  [address] [facility] [address]    Dear Nga,    It was a pleasure speaking with you over video on 12/3/2021. Here is a copy of the progress note from our discussion. If you have any additional questions, please feel free to call.    Referring Provider: Kinjal Cohen MD    Presenting Information:  I spoke to Nga today to discuss her genetic testing results. Her blood was drawn on 10/26/2021. BRCA1 and BRCA2 with automatic reflex to the Common Hereditary Cancers panel was ordered from [facility]. This testing was done because of Nga's family history of multiple cancer types.    Genetic Testing Result: NEGATIVE  Nga is negative for mutations in APC, KEITH, AXIN2, BARD1, BMPR1A, BRCA1, BRCA2, BRIP1, CDH1, CDK4, CDKN2A, CHEK2, CTNNA1, DICER1, EPCAM, GREM1, HOXB13, KIT, MEN1, MLH1, MSH2, MSH3, MSH6, MUTYH, NBN, NF1, NTHL1, PALB2, PDGFRA, PMS2, POLD1, POLE, PTEN, RAD50, RAD51C, RAD51D, SDHA, SDHB, SDHC, SDHD, SMAD4, SMARCA4, STK11, TP53, TSC1, TSC2, and VHL. No mutations were found in any of the 47 genes analyzed. This test involved sequencing and deletion/duplication analysis of all genes with the exceptions of EPCAM and GREM1 (deletions/duplications only) and SDHA (sequencing only).    A copy of the test report can be found in the Laboratory tab, dated 10/26/2021, and named \"LABORATORY MISCELLANEOUS ORDER\". The report is scanned in as a linked document.    Interpretation:  We discussed several different interpretations of this negative test result.    1. One explanation may be " that there is a different gene or combination of genes and environment that are associated with the cancers in this family.  2. There is also a small possibility that there is a mutation in one of these genes, and the testing laboratory could not find it with their current testing methods.       Screening:  Based on this negative test result, it is important for Nga and her relatives to refer back to the family history for appropriate cancer screening.      Based on her personal and family history, Nga has a 18.1% lifetime risk of developing breast cancer based on the JASIEL model. Therefore, Nga does not meet current National Comprehensive Cancer Network (NCCN) guidelines for high risk breast screening, which is offered to women with a 20% lifetime risk or higher. However, it is still important for Nga to continue with routine breast screening under the care of her physicians. Breast cancer screening is generally recommended to begin approximately 10 years younger than the earliest age of breast cancer diagnosis in the family, or at age 40, whichever comes first. In this family, screening may begin at age 34. Nga is encouraged to discuss breast screening with her physicians.   Due to Nga s family history of pancreatic cancer, screening for pancreatic cancer may be considered. This screening typically involves endoscopic ultrasonography (EUS) and/or MRI/magnetic resonance cholangiopancreatography (Andrez et al, Gut 2013. 62: 339-347; Gabriel S, et al., Am J Gastroenterol 2015. 110:223-262). Given the significant limitations of this screening, Nga could consider meeting with her primary care provider to discuss this screening in more detail. We discussed that based on Nga's family history, this screening could be consider at age 50.    Other population cancer screening options, such as those recommended by the American Cancer Society and the National Comprehensive Cancer Network (NCCN), are also appropriate for  Nga and her family. These screening recommendations may change if there are changes to Nga's personal and/or family history of cancer.    Final screening recommendations should be made by each individual's primary care provider.    Inheritance:  We reviewed the autosomal dominant inheritance. We discussed that Nga did not/cannot pass on an identifiable mutation in these genes to her children based on this test result. Mutations in these genes do not skip generations.      Additional Testing Considerations:  Although Nga's genetic testing result was negative, other relatives may still carry a gene mutation associated with cancer. Genetic counseling is recommended for Nga's maternal and paternal relatives to discuss genetic testing options. If any of these relatives do pursue genetic testing, Nga is encouraged to contact me so that we may review the impact of their test results on her.    Summary:  While no genetic changes were identified, Nga may still be at risk for certain cancers due to family history, environmental factors, or other genetic causes not identified by this test.  Because of that, it is important that she continue with cancer screening based on her personal and family history as discussed above.    Genetic testing is rapidly advancing, and new cancer susceptibility genes will most likely be identified in the future.  Therefore, I encouraged Nga to contact me annually or if there are changes in her personal or family history.  This may change how we assess her cancer risk, screening, and the testing we would offer.    Plan:  1. A copy of the test results will be mailed to Nga.  2. She plans to follow-up with Dr. Cohen.  3. She should contact me regularly, or sooner if her family history changes.    If Nga has any further questions, I encouraged her to contact me at  [phone].    Time spent on the video: 10 minutes.    Alexy Rivas MS, [facility]  Licensed, Certified Genetic Counselor  (P):  [phone]  (F): [phone]

## 2022-09-18 ENCOUNTER — HEALTH MAINTENANCE LETTER (OUTPATIENT)
Age: 37
End: 2022-09-18

## 2023-10-08 ENCOUNTER — HEALTH MAINTENANCE LETTER (OUTPATIENT)
Age: 38
End: 2023-10-08

## 2024-12-01 ENCOUNTER — HEALTH MAINTENANCE LETTER (OUTPATIENT)
Age: 39
End: 2024-12-01